# Patient Record
Sex: MALE | Race: WHITE | NOT HISPANIC OR LATINO | Employment: UNEMPLOYED | URBAN - METROPOLITAN AREA
[De-identification: names, ages, dates, MRNs, and addresses within clinical notes are randomized per-mention and may not be internally consistent; named-entity substitution may affect disease eponyms.]

---

## 2019-06-24 ENCOUNTER — HOSPITAL ENCOUNTER (INPATIENT)
Facility: MEDICAL CENTER | Age: 49
LOS: 2 days | DRG: 564 | End: 2019-06-27
Attending: EMERGENCY MEDICINE | Admitting: HOSPITALIST
Payer: OTHER MISCELLANEOUS

## 2019-06-24 DIAGNOSIS — R41.0 DELIRIUM: ICD-10-CM

## 2019-06-24 DIAGNOSIS — N17.9 AKI (ACUTE KIDNEY INJURY) (HCC): ICD-10-CM

## 2019-06-24 LAB
ALBUMIN SERPL BCP-MCNC: 4.9 G/DL (ref 3.2–4.9)
ALBUMIN/GLOB SERPL: 1.8 G/DL
ALP SERPL-CCNC: 79 U/L (ref 30–99)
ALT SERPL-CCNC: 89 U/L (ref 2–50)
ANION GAP SERPL CALC-SCNC: 14 MMOL/L (ref 0–11.9)
AST SERPL-CCNC: 95 U/L (ref 12–45)
BASOPHILS # BLD AUTO: 0.2 % (ref 0–1.8)
BASOPHILS # BLD: 0.03 K/UL (ref 0–0.12)
BILIRUB SERPL-MCNC: 1.7 MG/DL (ref 0.1–1.5)
BUN SERPL-MCNC: 42 MG/DL (ref 8–22)
CALCIUM SERPL-MCNC: 9.5 MG/DL (ref 8.5–10.5)
CHLORIDE SERPL-SCNC: 95 MMOL/L (ref 96–112)
CK SERPL-CCNC: 1334 U/L (ref 0–154)
CO2 SERPL-SCNC: 21 MMOL/L (ref 20–33)
CREAT SERPL-MCNC: 1.82 MG/DL (ref 0.5–1.4)
EOSINOPHIL # BLD AUTO: 0.06 K/UL (ref 0–0.51)
EOSINOPHIL NFR BLD: 0.5 % (ref 0–6.9)
ERYTHROCYTE [DISTWIDTH] IN BLOOD BY AUTOMATED COUNT: 46.3 FL (ref 35.9–50)
ETHANOL BLD-MCNC: 0.01 G/DL
GLOBULIN SER CALC-MCNC: 2.8 G/DL (ref 1.9–3.5)
GLUCOSE SERPL-MCNC: 109 MG/DL (ref 65–99)
HCT VFR BLD AUTO: 42.7 % (ref 42–52)
HGB BLD-MCNC: 14.5 G/DL (ref 14–18)
IMM GRANULOCYTES # BLD AUTO: 0.13 K/UL (ref 0–0.11)
IMM GRANULOCYTES NFR BLD AUTO: 1.1 % (ref 0–0.9)
INR PPP: 0.97 (ref 0.87–1.13)
LYMPHOCYTES # BLD AUTO: 0.57 K/UL (ref 1–4.8)
LYMPHOCYTES NFR BLD: 4.6 % (ref 22–41)
MCH RBC QN AUTO: 35.2 PG (ref 27–33)
MCHC RBC AUTO-ENTMCNC: 34 G/DL (ref 33.7–35.3)
MCV RBC AUTO: 103.6 FL (ref 81.4–97.8)
MONOCYTES # BLD AUTO: 1.41 K/UL (ref 0–0.85)
MONOCYTES NFR BLD AUTO: 11.5 % (ref 0–13.4)
NEUTROPHILS # BLD AUTO: 10.11 K/UL (ref 1.82–7.42)
NEUTROPHILS NFR BLD: 82.1 % (ref 44–72)
NRBC # BLD AUTO: 0 K/UL
NRBC BLD-RTO: 0 /100 WBC
PLATELET # BLD AUTO: 220 K/UL (ref 164–446)
PMV BLD AUTO: 9.8 FL (ref 9–12.9)
POTASSIUM SERPL-SCNC: 4.6 MMOL/L (ref 3.6–5.5)
PROT SERPL-MCNC: 7.7 G/DL (ref 6–8.2)
PROTHROMBIN TIME: 13.1 SEC (ref 12–14.6)
RBC # BLD AUTO: 4.12 M/UL (ref 4.7–6.1)
SODIUM SERPL-SCNC: 130 MMOL/L (ref 135–145)
WBC # BLD AUTO: 12.3 K/UL (ref 4.8–10.8)

## 2019-06-24 PROCEDURE — 85025 COMPLETE CBC W/AUTO DIFF WBC: CPT

## 2019-06-24 PROCEDURE — 82550 ASSAY OF CK (CPK): CPT

## 2019-06-24 PROCEDURE — 80053 COMPREHEN METABOLIC PANEL: CPT

## 2019-06-24 PROCEDURE — 700105 HCHG RX REV CODE 258: Performed by: EMERGENCY MEDICINE

## 2019-06-24 PROCEDURE — 84133 ASSAY OF URINE POTASSIUM: CPT

## 2019-06-24 PROCEDURE — 82436 ASSAY OF URINE CHLORIDE: CPT

## 2019-06-24 PROCEDURE — 96361 HYDRATE IV INFUSION ADD-ON: CPT

## 2019-06-24 PROCEDURE — 83935 ASSAY OF URINE OSMOLALITY: CPT

## 2019-06-24 PROCEDURE — 83735 ASSAY OF MAGNESIUM: CPT

## 2019-06-24 PROCEDURE — 85610 PROTHROMBIN TIME: CPT

## 2019-06-24 PROCEDURE — 84156 ASSAY OF PROTEIN URINE: CPT

## 2019-06-24 PROCEDURE — 82570 ASSAY OF URINE CREATININE: CPT

## 2019-06-24 PROCEDURE — 80307 DRUG TEST PRSMV CHEM ANLYZR: CPT

## 2019-06-24 PROCEDURE — 84300 ASSAY OF URINE SODIUM: CPT

## 2019-06-24 PROCEDURE — 81001 URINALYSIS AUTO W/SCOPE: CPT

## 2019-06-24 PROCEDURE — 99285 EMERGENCY DEPT VISIT HI MDM: CPT

## 2019-06-24 RX ORDER — SODIUM CHLORIDE 9 MG/ML
1000 INJECTION, SOLUTION INTRAVENOUS ONCE
Status: COMPLETED | OUTPATIENT
Start: 2019-06-24 | End: 2019-06-25

## 2019-06-24 RX ADMIN — SODIUM CHLORIDE 1000 ML: 9 INJECTION, SOLUTION INTRAVENOUS at 21:30

## 2019-06-25 ENCOUNTER — APPOINTMENT (OUTPATIENT)
Dept: RADIOLOGY | Facility: MEDICAL CENTER | Age: 49
DRG: 564 | End: 2019-06-25
Attending: HOSPITALIST
Payer: OTHER MISCELLANEOUS

## 2019-06-25 ENCOUNTER — APPOINTMENT (OUTPATIENT)
Dept: RADIOLOGY | Facility: MEDICAL CENTER | Age: 49
DRG: 564 | End: 2019-06-25
Attending: EMERGENCY MEDICINE
Payer: OTHER MISCELLANEOUS

## 2019-06-25 PROBLEM — G93.40 ENCEPHALOPATHY: Status: ACTIVE | Noted: 2019-06-25

## 2019-06-25 PROBLEM — F10.151: Status: ACTIVE | Noted: 2019-06-25

## 2019-06-25 PROBLEM — G40.909 SEIZURE DISORDER (HCC): Status: ACTIVE | Noted: 2019-06-25

## 2019-06-25 PROBLEM — R74.01 TRANSAMINITIS: Status: ACTIVE | Noted: 2019-06-25

## 2019-06-25 PROBLEM — K76.82 HEPATIC ENCEPHALOPATHY (HCC): Status: ACTIVE | Noted: 2019-06-25

## 2019-06-25 PROBLEM — E87.1 HYPONATREMIA: Status: ACTIVE | Noted: 2019-06-25

## 2019-06-25 PROBLEM — M62.82 RHABDOMYOLYSIS: Status: ACTIVE | Noted: 2019-06-25

## 2019-06-25 PROBLEM — N17.9 AKI (ACUTE KIDNEY INJURY) (HCC): Status: ACTIVE | Noted: 2019-06-25

## 2019-06-25 PROBLEM — F10.929 ALCOHOL INTOXICATION (HCC): Status: ACTIVE | Noted: 2019-06-25

## 2019-06-25 LAB
AMMONIA PLAS-SCNC: 58 UMOL/L (ref 11–45)
AMPHET UR QL SCN: NEGATIVE
APPEARANCE UR: CLEAR
BACTERIA #/AREA URNS HPF: NEGATIVE /HPF
BARBITURATES UR QL SCN: NEGATIVE
BENZODIAZ UR QL SCN: NEGATIVE
BILIRUB UR QL STRIP.AUTO: NEGATIVE
BZE UR QL SCN: NEGATIVE
CANNABINOIDS UR QL SCN: NEGATIVE
CHLORIDE UR-SCNC: <15 MMOL/L
COLOR UR: YELLOW
CREAT UR-MCNC: 185.2 MG/DL
EPI CELLS #/AREA URNS HPF: NEGATIVE /HPF
GLUCOSE UR STRIP.AUTO-MCNC: NEGATIVE MG/DL
HAV IGM SERPL QL IA: NEGATIVE
HBV CORE IGM SER QL: NEGATIVE
HBV SURFACE AG SER QL: NEGATIVE
HCV AB SER QL: NEGATIVE
HYALINE CASTS #/AREA URNS LPF: ABNORMAL /LPF
KETONES UR STRIP.AUTO-MCNC: 15 MG/DL
LEUKOCYTE ESTERASE UR QL STRIP.AUTO: NEGATIVE
MAGNESIUM SERPL-MCNC: 2.5 MG/DL (ref 1.5–2.5)
METHADONE UR QL SCN: NEGATIVE
MICRO URNS: ABNORMAL
NITRITE UR QL STRIP.AUTO: NEGATIVE
OPIATES UR QL SCN: NEGATIVE
OSMOLALITY SERPL: 278 MOSM/KG H2O (ref 278–298)
OSMOLALITY UR: 726 MOSM/KG H2O (ref 300–900)
OXYCODONE UR QL SCN: NEGATIVE
PCP UR QL SCN: NEGATIVE
PH UR STRIP.AUTO: 5.5 [PH]
POTASSIUM UR-SCNC: 60.9 MMOL/L
PROPOXYPH UR QL SCN: NEGATIVE
PROT UR QL STRIP: NEGATIVE MG/DL
PROT UR-MCNC: 29.1 MG/DL (ref 0–15)
RBC # URNS HPF: ABNORMAL /HPF
RBC UR QL AUTO: ABNORMAL
SODIUM UR-SCNC: 21 MMOL/L
SP GR UR STRIP.AUTO: 1.02
TROPONIN I SERPL-MCNC: 0.02 NG/ML (ref 0–0.04)
UROBILINOGEN UR STRIP.AUTO-MCNC: 0.2 MG/DL
WBC #/AREA URNS HPF: ABNORMAL /HPF

## 2019-06-25 PROCEDURE — 700111 HCHG RX REV CODE 636 W/ 250 OVERRIDE (IP): Performed by: HOSPITALIST

## 2019-06-25 PROCEDURE — 700101 HCHG RX REV CODE 250: Performed by: EMERGENCY MEDICINE

## 2019-06-25 PROCEDURE — 82140 ASSAY OF AMMONIA: CPT

## 2019-06-25 PROCEDURE — 84484 ASSAY OF TROPONIN QUANT: CPT

## 2019-06-25 PROCEDURE — 71045 X-RAY EXAM CHEST 1 VIEW: CPT

## 2019-06-25 PROCEDURE — 700105 HCHG RX REV CODE 258: Performed by: HOSPITALIST

## 2019-06-25 PROCEDURE — 700102 HCHG RX REV CODE 250 W/ 637 OVERRIDE(OP): Performed by: INTERNAL MEDICINE

## 2019-06-25 PROCEDURE — A9270 NON-COVERED ITEM OR SERVICE: HCPCS | Performed by: HOSPITALIST

## 2019-06-25 PROCEDURE — 83930 ASSAY OF BLOOD OSMOLALITY: CPT

## 2019-06-25 PROCEDURE — 700102 HCHG RX REV CODE 250 W/ 637 OVERRIDE(OP): Performed by: HOSPITALIST

## 2019-06-25 PROCEDURE — 99233 SBSQ HOSP IP/OBS HIGH 50: CPT | Performed by: HOSPITALIST

## 2019-06-25 PROCEDURE — 96361 HYDRATE IV INFUSION ADD-ON: CPT

## 2019-06-25 PROCEDURE — A9270 NON-COVERED ITEM OR SERVICE: HCPCS | Performed by: INTERNAL MEDICINE

## 2019-06-25 PROCEDURE — 96365 THER/PROPH/DIAG IV INF INIT: CPT

## 2019-06-25 PROCEDURE — 770020 HCHG ROOM/CARE - TELE (206)

## 2019-06-25 PROCEDURE — 700105 HCHG RX REV CODE 258: Performed by: EMERGENCY MEDICINE

## 2019-06-25 PROCEDURE — 80074 ACUTE HEPATITIS PANEL: CPT

## 2019-06-25 PROCEDURE — 87040 BLOOD CULTURE FOR BACTERIA: CPT

## 2019-06-25 PROCEDURE — 70450 CT HEAD/BRAIN W/O DYE: CPT

## 2019-06-25 PROCEDURE — 36415 COLL VENOUS BLD VENIPUNCTURE: CPT

## 2019-06-25 PROCEDURE — 99223 1ST HOSP IP/OBS HIGH 75: CPT | Performed by: HOSPITALIST

## 2019-06-25 PROCEDURE — 700111 HCHG RX REV CODE 636 W/ 250 OVERRIDE (IP): Performed by: EMERGENCY MEDICINE

## 2019-06-25 PROCEDURE — 96375 TX/PRO/DX INJ NEW DRUG ADDON: CPT

## 2019-06-25 PROCEDURE — 96366 THER/PROPH/DIAG IV INF ADDON: CPT

## 2019-06-25 RX ORDER — LORAZEPAM 1 MG/1
1 TABLET ORAL ONCE
Status: DISCONTINUED | OUTPATIENT
Start: 2019-06-25 | End: 2019-06-25

## 2019-06-25 RX ORDER — SODIUM CHLORIDE 9 MG/ML
INJECTION, SOLUTION INTRAVENOUS CONTINUOUS
Status: DISCONTINUED | OUTPATIENT
Start: 2019-06-25 | End: 2019-06-26

## 2019-06-25 RX ORDER — SODIUM CHLORIDE 9 MG/ML
4000 INJECTION, SOLUTION INTRAVENOUS ONCE
Status: COMPLETED | OUTPATIENT
Start: 2019-06-25 | End: 2019-06-25

## 2019-06-25 RX ORDER — BISACODYL 10 MG
10 SUPPOSITORY, RECTAL RECTAL
Status: DISCONTINUED | OUTPATIENT
Start: 2019-06-25 | End: 2019-06-27 | Stop reason: HOSPADM

## 2019-06-25 RX ORDER — LEVETIRACETAM 500 MG/1
500 TABLET ORAL 2 TIMES DAILY
Status: ON HOLD | COMMUNITY
End: 2019-06-27

## 2019-06-25 RX ORDER — LORAZEPAM 2 MG/1
2 TABLET ORAL
Status: DISCONTINUED | OUTPATIENT
Start: 2019-06-25 | End: 2019-06-25

## 2019-06-25 RX ORDER — LORAZEPAM 1 MG/1
1 TABLET ORAL EVERY 4 HOURS PRN
Status: DISCONTINUED | OUTPATIENT
Start: 2019-06-25 | End: 2019-06-25

## 2019-06-25 RX ORDER — CLONIDINE HYDROCHLORIDE 0.1 MG/1
0.1 TABLET ORAL
Status: DISCONTINUED | OUTPATIENT
Start: 2019-06-25 | End: 2019-06-27 | Stop reason: HOSPADM

## 2019-06-25 RX ORDER — HEPARIN SODIUM 5000 [USP'U]/ML
5000 INJECTION, SOLUTION INTRAVENOUS; SUBCUTANEOUS EVERY 8 HOURS
Status: DISCONTINUED | OUTPATIENT
Start: 2019-06-25 | End: 2019-06-27 | Stop reason: HOSPADM

## 2019-06-25 RX ORDER — LORAZEPAM 1 MG/1
0.5 TABLET ORAL EVERY 4 HOURS PRN
Status: DISCONTINUED | OUTPATIENT
Start: 2019-06-25 | End: 2019-06-25

## 2019-06-25 RX ORDER — LORAZEPAM 2 MG/ML
1 INJECTION INTRAMUSCULAR
Status: DISCONTINUED | OUTPATIENT
Start: 2019-06-25 | End: 2019-06-25

## 2019-06-25 RX ORDER — LACTULOSE 20 G/30ML
30 SOLUTION ORAL 3 TIMES DAILY
Status: DISCONTINUED | OUTPATIENT
Start: 2019-06-25 | End: 2019-06-26

## 2019-06-25 RX ORDER — THIAMINE MONONITRATE (VIT B1) 100 MG
100 TABLET ORAL DAILY
Status: DISCONTINUED | OUTPATIENT
Start: 2019-06-26 | End: 2019-06-25

## 2019-06-25 RX ORDER — LORAZEPAM 2 MG/1
4 TABLET ORAL
Status: DISCONTINUED | OUTPATIENT
Start: 2019-06-25 | End: 2019-06-25

## 2019-06-25 RX ORDER — FOLIC ACID 1 MG/1
1 TABLET ORAL DAILY
Status: DISCONTINUED | OUTPATIENT
Start: 2019-06-25 | End: 2019-06-27 | Stop reason: HOSPADM

## 2019-06-25 RX ORDER — SODIUM CHLORIDE 9 MG/ML
1000 INJECTION, SOLUTION INTRAVENOUS ONCE
Status: COMPLETED | OUTPATIENT
Start: 2019-06-25 | End: 2019-06-25

## 2019-06-25 RX ORDER — THIAMINE MONONITRATE (VIT B1) 100 MG
100 TABLET ORAL DAILY
Status: DISCONTINUED | OUTPATIENT
Start: 2019-06-25 | End: 2019-06-27 | Stop reason: HOSPADM

## 2019-06-25 RX ORDER — ACETAMINOPHEN 325 MG/1
650 TABLET ORAL EVERY 6 HOURS PRN
Status: DISCONTINUED | OUTPATIENT
Start: 2019-06-25 | End: 2019-06-27 | Stop reason: HOSPADM

## 2019-06-25 RX ORDER — POLYETHYLENE GLYCOL 3350 17 G/17G
1 POWDER, FOR SOLUTION ORAL
Status: DISCONTINUED | OUTPATIENT
Start: 2019-06-25 | End: 2019-06-27 | Stop reason: HOSPADM

## 2019-06-25 RX ORDER — LORAZEPAM 2 MG/ML
1 INJECTION INTRAMUSCULAR ONCE
Status: COMPLETED | OUTPATIENT
Start: 2019-06-25 | End: 2019-06-25

## 2019-06-25 RX ORDER — FOLIC ACID 1 MG/1
1 TABLET ORAL DAILY
Status: DISCONTINUED | OUTPATIENT
Start: 2019-06-26 | End: 2019-06-25

## 2019-06-25 RX ORDER — AMOXICILLIN 250 MG
2 CAPSULE ORAL 2 TIMES DAILY
Status: DISCONTINUED | OUTPATIENT
Start: 2019-06-25 | End: 2019-06-27 | Stop reason: HOSPADM

## 2019-06-25 RX ORDER — LORAZEPAM 2 MG/ML
1.5 INJECTION INTRAMUSCULAR
Status: DISCONTINUED | OUTPATIENT
Start: 2019-06-25 | End: 2019-06-25

## 2019-06-25 RX ORDER — LEVETIRACETAM 500 MG/1
500 TABLET ORAL 2 TIMES DAILY
Status: DISCONTINUED | OUTPATIENT
Start: 2019-06-25 | End: 2019-06-27 | Stop reason: HOSPADM

## 2019-06-25 RX ORDER — LORAZEPAM 2 MG/ML
0.5 INJECTION INTRAMUSCULAR EVERY 4 HOURS PRN
Status: DISCONTINUED | OUTPATIENT
Start: 2019-06-25 | End: 2019-06-25

## 2019-06-25 RX ORDER — LORAZEPAM 2 MG/ML
2 INJECTION INTRAMUSCULAR
Status: DISCONTINUED | OUTPATIENT
Start: 2019-06-25 | End: 2019-06-25

## 2019-06-25 RX ADMIN — ACETAMINOPHEN 650 MG: 325 TABLET, FILM COATED ORAL at 23:49

## 2019-06-25 RX ADMIN — LACTULOSE 30 ML: 20 SOLUTION ORAL at 17:02

## 2019-06-25 RX ADMIN — THIAMINE HYDROCHLORIDE: 100 INJECTION, SOLUTION INTRAMUSCULAR; INTRAVENOUS at 02:15

## 2019-06-25 RX ADMIN — HEPARIN SODIUM 5000 UNITS: 5000 INJECTION, SOLUTION INTRAVENOUS; SUBCUTANEOUS at 13:15

## 2019-06-25 RX ADMIN — LEVETIRACETAM 500 MG: 500 TABLET ORAL at 20:17

## 2019-06-25 RX ADMIN — SODIUM CHLORIDE: 9 INJECTION, SOLUTION INTRAVENOUS at 11:45

## 2019-06-25 RX ADMIN — SODIUM CHLORIDE 4000 ML: 9 INJECTION, SOLUTION INTRAVENOUS at 04:00

## 2019-06-25 RX ADMIN — SODIUM CHLORIDE: 9 INJECTION, SOLUTION INTRAVENOUS at 18:39

## 2019-06-25 RX ADMIN — SODIUM CHLORIDE 1000 ML: 9 INJECTION, SOLUTION INTRAVENOUS at 01:00

## 2019-06-25 RX ADMIN — HEPARIN SODIUM 5000 UNITS: 5000 INJECTION, SOLUTION INTRAVENOUS; SUBCUTANEOUS at 20:17

## 2019-06-25 RX ADMIN — LACTULOSE 30 ML: 20 SOLUTION ORAL at 11:45

## 2019-06-25 RX ADMIN — LORAZEPAM 1 MG: 2 INJECTION INTRAMUSCULAR; INTRAVENOUS at 01:20

## 2019-06-25 ASSESSMENT — LIFESTYLE VARIABLES
HOW MANY TIMES IN THE PAST YEAR HAVE YOU HAD 5 OR MORE DRINKS IN A DAY: 0
CONSUMPTION TOTAL: POSITIVE
AVERAGE NUMBER OF DAYS PER WEEK YOU HAVE A DRINK CONTAINING ALCOHOL: 10
ALCOHOL_USE: YES
SUBSTANCE_ABUSE: 1
DOES PATIENT WANT TO STOP DRINKING: NO
TOTAL SCORE: 4
EVER HAD A DRINK FIRST THING IN THE MORNING TO STEADY YOUR NERVES TO GET RID OF A HANGOVER: YES
TOTAL SCORE: 4
HAVE YOU EVER FELT YOU SHOULD CUT DOWN ON YOUR DRINKING: YES
EVER FELT BAD OR GUILTY ABOUT YOUR DRINKING: YES
ON A TYPICAL DAY WHEN YOU DRINK ALCOHOL HOW MANY DRINKS DO YOU HAVE: 2
TOTAL SCORE: 4
HAVE PEOPLE ANNOYED YOU BY CRITICIZING YOUR DRINKING: YES

## 2019-06-25 ASSESSMENT — ENCOUNTER SYMPTOMS
DIZZINESS: 0
DIAPHORESIS: 0
DEPRESSION: 0
MYALGIAS: 1
CHILLS: 0
EYE DISCHARGE: 0
HEADACHES: 0
VOMITING: 0
FOCAL WEAKNESS: 0
MEMORY LOSS: 1
NECK PAIN: 0
CONSTIPATION: 0
PALPITATIONS: 0
SHORTNESS OF BREATH: 0
ABDOMINAL PAIN: 0
FEVER: 0
SENSORY CHANGE: 0
NAUSEA: 0
SORE THROAT: 0
EYE PAIN: 0
BACK PAIN: 0
LOSS OF CONSCIOUSNESS: 0
CLAUDICATION: 0
BRUISES/BLEEDS EASILY: 0
HEMOPTYSIS: 0
COUGH: 0
WHEEZING: 0
SPUTUM PRODUCTION: 0
SPEECH CHANGE: 0
DIARRHEA: 0
WEAKNESS: 0

## 2019-06-25 ASSESSMENT — PATIENT HEALTH QUESTIONNAIRE - PHQ9
SUM OF ALL RESPONSES TO PHQ9 QUESTIONS 1 AND 2: 0
1. LITTLE INTEREST OR PLEASURE IN DOING THINGS: NOT AT ALL
2. FEELING DOWN, DEPRESSED, IRRITABLE, OR HOPELESS: NOT AT ALL

## 2019-06-25 ASSESSMENT — COGNITIVE AND FUNCTIONAL STATUS - GENERAL
SUGGESTED CMS G CODE MODIFIER DAILY ACTIVITY: CH
MOBILITY SCORE: 24
SUGGESTED CMS G CODE MODIFIER MOBILITY: CH
DAILY ACTIVITIY SCORE: 24

## 2019-06-25 NOTE — ASSESSMENT & PLAN NOTE
Multifactorial, toxic metabolic, dehydration, baseline encephalomalacia, history of traumatic brain injury.  UA is unrevealing, his CT head shows a old injury but nothing acute.    Had mildly elevated ammonia was initially started on lactulose, patient is currently having a lot of diarrhea, I am stopping  Improving

## 2019-06-25 NOTE — ED NOTES
Pt awoke at this time.  Up to eob and urinated on the floor.  Pt back to bed at this time.  Continue to monitor.

## 2019-06-25 NOTE — CARE PLAN
Problem: Safety  Goal: Will remain free from falls  Outcome: PROGRESSING AS EXPECTED  Patient stable and has a steady gait. Following directions.    Problem: Knowledge Deficit  Goal: Knowledge of disease process/condition, treatment plan, diagnostic tests, and medications will improve  Outcome: PROGRESSING SLOWER THAN EXPECTED  Patient cooperative but confused. Desires to know more about treatment plan. MD notified and reported she would be up to see him soon.

## 2019-06-25 NOTE — ED NOTES
Pt getting himself undressed and attempting to get up out of room.  Pt states at this time that he is scared of this nurse and afraid of harm.  Pt chooses not to wear gown at this time.  Pt thinks he has been abducted by aliens and going to have battery acid given to him.  Pt difficult to re orient at this time.  Pt sitting back in bed.  Security and MD paged.  Life skills called.  Life skillls at BS
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
## 2019-06-25 NOTE — ED NOTES
Pt provided with o2 2l/nc secondary sats down to 84% while sleeping RA.  Pt arousable.  Continues to be disoriented.

## 2019-06-25 NOTE — DISCHARGE PLANNING
"Patient sitting in bed and appears confused with some brief moments of lucidity.  During conversation patient stood up and went to doorway yelling, \"Whats wrong? What do you need?\" Patient reported he thought he heard his wife crying; however, earlier in the conversation, patient stated he is not . Patient is from [place], [place] and does not remember why or when he came to [place]. States he has been walking for hours and does not know where his car is. \"I lost it somewhere.  Not sure where.\"   Reports he recently left a detox/rehab facility in [place]; however, does not remember how long he was there or when he was discharged.  Patient does admit to being an alcoholic and has been hospitalized on multiple occassions for detox/rehab. Patient denies history of mental illness or treatment outside of substance addiction/dependence.    "

## 2019-06-25 NOTE — PROGRESS NOTES
Assumed care of patient and bedside report received from previous RN Cameron. Plan of care discussed with patient. All concerns voiced at this time. Patient is alert and oriented x 3-4. VSS. Bed controls on, bed locked and in lowest position. Patient is in rhythm: SR. Patient is on legal hold. Paperwork is signed and in patient's chart. All items removed from patient's room. Security signed off on patient's belongings which are now locked on unit. Awaiting MD Villela's assessment of patient for instructions on patient's exact privileges. Notified her that patient is on unit and she noted that she will be up shortly to see him. Will continue to monitor.

## 2019-06-25 NOTE — ED NOTES
Attending Hospitalist is Dr Villela starting at 0700. Please contact this physician for orders, updates or questions today.

## 2019-06-25 NOTE — ASSESSMENT & PLAN NOTE
Improving with intravenous fluids.  Had acute kidney injury initially that is improving with intravenous fluids

## 2019-06-25 NOTE — ED NOTES
Received report from DOYLE Ac, taking over pt care. Pt resting comfortably, no needs at this time, bed in lowered position, side rails up, pt has no needs at this time
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
## 2019-06-25 NOTE — PROGRESS NOTES
2 RN skin check complete with RNDebbie.  Devices in place: none.  Skin assess under devices: n/a.  Confirmed pressure ulcers found on: n/a.  New potential pressure ulcers noted on: n/a. Wound consult placed and wound reported: n/a.    Skin issues are as follows:  BL cracking on heels.  Left big toe cracked with old blood.  Scattered small scabs on BL feet.  Upper neck, face, BL shoulders, BL forearms, and BL ears sunburned and blanching.   Sacrum red and blanching.    The following interventions in place:  Patient turns self from side to side, pillows in use for support/repositioning, moisturizer PRN

## 2019-06-25 NOTE — PROGRESS NOTES
"Patient gave multiple numbers of friends and family members to contact with permission to discuss patient's medical situation and plan of care. Voicemails left for those who did not answer. Father answered, patient's current situation updated and plan of care discussed. Father lives in [place] and reported he will be the one to  his son when discharge is approaching and had no further questions. Father notified of our unit's number for further questions.    Patient reports that he \"thinks he has a wife but does not know for sure.\" Reports \"I am seeing two women but am not sure if one is my wife or if the other is my girlfriend.\" Patient is appropriate, calm, and cooperative and maintains mostly coherent conversation. Still reports that he only remembers \"pieces of the last month.\" Reports that he has not used drugs in years and \"doesn't drink much.\" Still awaiting doctor Tika's orders as she has not yet seen the patient. Will continue to monitor.    "

## 2019-06-25 NOTE — ED TRIAGE NOTES
BIB by ems on Legal 2000 for irratic behavior.  Pt was last seen in [place] by father in a alcohol detox center.  Pt made it [place] in a car and has been walking for several hours today with no car to be found.   Pt was attempting to get into another persons residence and car thinking it was his own.  Disoriented to paramedics.  Pt slow to respond but answering questions appropriately other than current situation and why he came to [place].  Pt does not understand why he was brought to [facility].

## 2019-06-26 LAB
ALBUMIN SERPL BCP-MCNC: 3.2 G/DL (ref 3.2–4.9)
ALBUMIN/GLOB SERPL: 1.5 G/DL
ALP SERPL-CCNC: 67 U/L (ref 30–99)
ALT SERPL-CCNC: 69 U/L (ref 2–50)
ANION GAP SERPL CALC-SCNC: 8 MMOL/L (ref 0–11.9)
AST SERPL-CCNC: 66 U/L (ref 12–45)
BILIRUB SERPL-MCNC: 0.7 MG/DL (ref 0.1–1.5)
BUN SERPL-MCNC: 9 MG/DL (ref 8–22)
CALCIUM SERPL-MCNC: 8.3 MG/DL (ref 8.5–10.5)
CHLORIDE SERPL-SCNC: 101 MMOL/L (ref 96–112)
CK SERPL-CCNC: 638 U/L (ref 0–154)
CO2 SERPL-SCNC: 22 MMOL/L (ref 20–33)
CREAT SERPL-MCNC: 0.71 MG/DL (ref 0.5–1.4)
ERYTHROCYTE [DISTWIDTH] IN BLOOD BY AUTOMATED COUNT: 44.8 FL (ref 35.9–50)
GLOBULIN SER CALC-MCNC: 2.1 G/DL (ref 1.9–3.5)
GLUCOSE SERPL-MCNC: 103 MG/DL (ref 65–99)
HCT VFR BLD AUTO: 32.9 % (ref 42–52)
HGB BLD-MCNC: 11.4 G/DL (ref 14–18)
MAGNESIUM SERPL-MCNC: 1.7 MG/DL (ref 1.5–2.5)
MCH RBC QN AUTO: 36 PG (ref 27–33)
MCHC RBC AUTO-ENTMCNC: 34.9 G/DL (ref 33.7–35.3)
MCV RBC AUTO: 103.2 FL (ref 81.4–97.8)
PHOSPHATE SERPL-MCNC: 2.9 MG/DL (ref 2.5–4.5)
PLATELET # BLD AUTO: 187 K/UL (ref 164–446)
PMV BLD AUTO: 9.5 FL (ref 9–12.9)
POTASSIUM SERPL-SCNC: 3.6 MMOL/L (ref 3.6–5.5)
PROT SERPL-MCNC: 5.3 G/DL (ref 6–8.2)
RBC # BLD AUTO: 3.17 M/UL (ref 4.7–6.1)
SODIUM SERPL-SCNC: 131 MMOL/L (ref 135–145)
VIT B12 SERPL-MCNC: 504 PG/ML (ref 211–911)
WBC # BLD AUTO: 6.1 K/UL (ref 4.8–10.8)

## 2019-06-26 PROCEDURE — 85027 COMPLETE CBC AUTOMATED: CPT

## 2019-06-26 PROCEDURE — A9270 NON-COVERED ITEM OR SERVICE: HCPCS | Performed by: HOSPITALIST

## 2019-06-26 PROCEDURE — 700105 HCHG RX REV CODE 258: Performed by: HOSPITALIST

## 2019-06-26 PROCEDURE — 82550 ASSAY OF CK (CPK): CPT

## 2019-06-26 PROCEDURE — A9270 NON-COVERED ITEM OR SERVICE: HCPCS | Performed by: STUDENT IN AN ORGANIZED HEALTH CARE EDUCATION/TRAINING PROGRAM

## 2019-06-26 PROCEDURE — 84425 ASSAY OF VITAMIN B-1: CPT

## 2019-06-26 PROCEDURE — 700102 HCHG RX REV CODE 250 W/ 637 OVERRIDE(OP): Performed by: STUDENT IN AN ORGANIZED HEALTH CARE EDUCATION/TRAINING PROGRAM

## 2019-06-26 PROCEDURE — 99222 1ST HOSP IP/OBS MODERATE 55: CPT | Mod: GC | Performed by: PSYCHIATRY & NEUROLOGY

## 2019-06-26 PROCEDURE — 99406 BEHAV CHNG SMOKING 3-10 MIN: CPT | Performed by: HOSPITALIST

## 2019-06-26 PROCEDURE — 83735 ASSAY OF MAGNESIUM: CPT

## 2019-06-26 PROCEDURE — 99232 SBSQ HOSP IP/OBS MODERATE 35: CPT | Mod: 25 | Performed by: HOSPITALIST

## 2019-06-26 PROCEDURE — 84100 ASSAY OF PHOSPHORUS: CPT

## 2019-06-26 PROCEDURE — 700111 HCHG RX REV CODE 636 W/ 250 OVERRIDE (IP): Performed by: HOSPITALIST

## 2019-06-26 PROCEDURE — 700102 HCHG RX REV CODE 250 W/ 637 OVERRIDE(OP): Performed by: HOSPITALIST

## 2019-06-26 PROCEDURE — 82607 VITAMIN B-12: CPT

## 2019-06-26 PROCEDURE — 770020 HCHG ROOM/CARE - TELE (206)

## 2019-06-26 PROCEDURE — 36415 COLL VENOUS BLD VENIPUNCTURE: CPT

## 2019-06-26 PROCEDURE — 80053 COMPREHEN METABOLIC PANEL: CPT

## 2019-06-26 RX ORDER — MEMANTINE HYDROCHLORIDE 10 MG/1
5 TABLET ORAL DAILY
Status: DISCONTINUED | OUTPATIENT
Start: 2019-06-26 | End: 2019-06-27 | Stop reason: HOSPADM

## 2019-06-26 RX ORDER — NICOTINE 21 MG/24HR
21 PATCH, TRANSDERMAL 24 HOURS TRANSDERMAL
Status: DISCONTINUED | OUTPATIENT
Start: 2019-06-26 | End: 2019-06-27 | Stop reason: HOSPADM

## 2019-06-26 RX ORDER — M-VIT,TX,IRON,MINS/CALC/FOLIC 27MG-0.4MG
1 TABLET ORAL DAILY
COMMUNITY

## 2019-06-26 RX ORDER — ARIPIPRAZOLE 2 MG/1
2 TABLET ORAL DAILY
Status: DISCONTINUED | OUTPATIENT
Start: 2019-06-26 | End: 2019-06-27 | Stop reason: HOSPADM

## 2019-06-26 RX ADMIN — MEMANTINE HYDROCHLORIDE 5 MG: 10 TABLET ORAL at 12:33

## 2019-06-26 RX ADMIN — LACTULOSE 30 ML: 20 SOLUTION ORAL at 12:35

## 2019-06-26 RX ADMIN — LEVETIRACETAM 500 MG: 500 TABLET ORAL at 17:17

## 2019-06-26 RX ADMIN — NICOTINE 21 MG: 21 PATCH, EXTENDED RELEASE TRANSDERMAL at 12:35

## 2019-06-26 RX ADMIN — FOLIC ACID 1 MG: 1 TABLET ORAL at 05:46

## 2019-06-26 RX ADMIN — ARIPIPRAZOLE 2 MG: 2 TABLET ORAL at 12:34

## 2019-06-26 RX ADMIN — HEPARIN SODIUM 5000 UNITS: 5000 INJECTION, SOLUTION INTRAVENOUS; SUBCUTANEOUS at 20:25

## 2019-06-26 RX ADMIN — THERA TABS 1 TABLET: TAB at 05:46

## 2019-06-26 RX ADMIN — HEPARIN SODIUM 5000 UNITS: 5000 INJECTION, SOLUTION INTRAVENOUS; SUBCUTANEOUS at 13:12

## 2019-06-26 RX ADMIN — LEVETIRACETAM 500 MG: 500 TABLET ORAL at 05:44

## 2019-06-26 RX ADMIN — SODIUM CHLORIDE: 9 INJECTION, SOLUTION INTRAVENOUS at 08:12

## 2019-06-26 RX ADMIN — LACTULOSE 30 ML: 20 SOLUTION ORAL at 05:44

## 2019-06-26 RX ADMIN — Medication 100 MG: at 05:46

## 2019-06-26 RX ADMIN — SODIUM CHLORIDE: 9 INJECTION, SOLUTION INTRAVENOUS at 01:27

## 2019-06-26 RX ADMIN — HEPARIN SODIUM 5000 UNITS: 5000 INJECTION, SOLUTION INTRAVENOUS; SUBCUTANEOUS at 05:46

## 2019-06-26 ASSESSMENT — LIFESTYLE VARIABLES
ANXIETY: NO ANXIETY (AT EASE)
AGITATION: NORMAL ACTIVITY
TOTAL SCORE: 0
PAROXYSMAL SWEATS: NO SWEAT VISIBLE
ORIENTATION AND CLOUDING OF SENSORIUM: ORIENTED AND CAN DO SERIAL ADDITIONS
HEADACHE, FULLNESS IN HEAD: NOT PRESENT
VISUAL DISTURBANCES: NOT PRESENT
TREMOR: NO TREMOR
AUDITORY DISTURBANCES: NOT PRESENT
NAUSEA AND VOMITING: NO NAUSEA AND NO VOMITING
SUBSTANCE_ABUSE: 0

## 2019-06-26 ASSESSMENT — ENCOUNTER SYMPTOMS
BRUISES/BLEEDS EASILY: 0
DIZZINESS: 0
DIAPHORESIS: 0
SPEECH CHANGE: 0
FEVER: 0
BACK PAIN: 0
NECK PAIN: 0
CONSTIPATION: 0
PALPITATIONS: 0
CLAUDICATION: 0
SENSORY CHANGE: 0
LOSS OF CONSCIOUSNESS: 0
CHILLS: 0
ABDOMINAL PAIN: 0
COUGH: 0
MEMORY LOSS: 1
NAUSEA: 0
EYE DISCHARGE: 0
EYE PAIN: 0
MYALGIAS: 1
HEADACHES: 0
WEAKNESS: 0
FOCAL WEAKNESS: 0
WHEEZING: 0
SPUTUM PRODUCTION: 0
SORE THROAT: 0
HEMOPTYSIS: 0
DEPRESSION: 0
DIARRHEA: 1
VOMITING: 0
SHORTNESS OF BREATH: 0

## 2019-06-26 ASSESSMENT — PATIENT HEALTH QUESTIONNAIRE - PHQ9
SUM OF ALL RESPONSES TO PHQ9 QUESTIONS 1 AND 2: 0
2. FEELING DOWN, DEPRESSED, IRRITABLE, OR HOPELESS: NOT AT ALL
1. LITTLE INTEREST OR PLEASURE IN DOING THINGS: NOT AT ALL

## 2019-06-26 NOTE — ASSESSMENT & PLAN NOTE
Remembers he takes keppra 500 mg bid.  Continue Keppra   History of TBI 10 years ago with hemorrhage per patient.  Head CT wnl.  No active seizure activity noted.

## 2019-06-26 NOTE — PSYCHIATRY
PSYCHIATRY NOTE: Consult received and assigned to Dr. Talley. Patient will be seen within the next 24 hours. Thank you.

## 2019-06-26 NOTE — CARE PLAN
Problem: Communication  Goal: The ability to communicate needs accurately and effectively will improve  Outcome: PROGRESSING AS EXPECTED  Discussed POC and medications with patient. Pt educated that psych needs to see him prior to discharge because he is on a legal hold. Pt verbalized understanding.      Problem: Safety  Goal: Will remain free from injury  Outcome: PROGRESSING AS EXPECTED  Bed locked and in lowest position. Bed alarm on. Treaded socks provided. Call light and belongings within reach.  Pt on legal hold, Q15min observation. Patient educated to call for assistance. Pt verbalized understanding. Hourly rounding in place.

## 2019-06-26 NOTE — PROGRESS NOTES
Received report from day shift RNElana. Assumed care of pt. Pt reports no pain at this time. Updated pt on plan of care. Pt resting comfortably in bed. Bed alarm inplace. Educated on use of call light. Hourly rounding and continuous monitoring in place. Pt placed on legal hold, call light and telephone not in room, pending psych consult. Q15 obs in place.

## 2019-06-26 NOTE — PROGRESS NOTES
Hospital Medicine Daily Progress Note    Date of Service  6/27/2019    Chief Complaint  48 y.o. male admitted 6/24/2019 with erratic behavior, amnesia brought in by police under legal hold    Hospital Course      48 years old male with past medical history of alcoholism, traumatic brain injury with encephalomalacia admitted June 24 with erratic behavior, amnesia and was placed on a legal hold by [employer] as he was trying to break into a house, and he was wondering barefooted 4 days thinking that he was in [place] looking for his mother's house.  Reportedly he was an an alcohol rehab [facility] in [place] and he left AGAINST MEDICAL ADVICE recently.  He was noticed to have acute kidney injury with rhabdomyolysis and started on.  Psychiatry have been consulted and his legal hold was discontinued.         Interval update  Heart rate mostly 60s-70s, blood pressure within normal limits and saturating well on room air this morning.  Making good urine output, creatinine improved to 0.71 down from 1.82.  CPK still elevated 638, down from 1334.  I will watch his renal function off intravenous fluids.  Had mildly elevated ammonia was initially started on lactulose, patient is currently having a lot of diarrhea, I am stopping   Hepatitis panel negative    Psychiatry evaluated the patient and discontinued his legal hold.  I discussed with case management, requested to check with Police Department if the patient is still in custody or need to go to custodial after discharge.  Discussed with patient, patient's nurse and with multidisciplinary team during rounds including , pharmacist and charge nurse.      Consultants/Specialty  Psychiatry     Code Status  FULL     Disposition  Psychiatry and medical clearance.    Has father in [place] who is willing to drive to [email or web address] to  patient when ready for dc.    Review of Systems  Review of Systems   Constitutional: Positive for malaise/fatigue. Negative for chills,  diaphoresis and fever.   HENT: Negative for congestion and sore throat.    Eyes: Negative for pain and discharge.   Respiratory: Negative for cough, hemoptysis, sputum production, shortness of breath and wheezing.    Cardiovascular: Negative for chest pain, palpitations, claudication and leg swelling.   Gastrointestinal: Positive for diarrhea. Negative for abdominal pain, constipation, melena, nausea and vomiting.   Genitourinary: Negative for dysuria, frequency and urgency.   Musculoskeletal: Positive for myalgias. Negative for back pain, joint pain and neck pain.   Skin: Negative for itching and rash.   Neurological: Negative for dizziness, sensory change, speech change, focal weakness, loss of consciousness, weakness and headaches.   Endo/Heme/Allergies: Does not bruise/bleed easily.   Psychiatric/Behavioral: Positive for memory loss. Negative for depression, substance abuse and suicidal ideas.        Physical Exam  Temp:  [36.2 °C (97.1 °F)-37.2 °C (99 °F)] 36.6 °C (97.8 °F)  Pulse:  [64-85] 79  Resp:  [16-17] 17  BP: (130-151)/(85-93) 151/93  SpO2:  [93 %-98 %] 98 %    Physical Exam   Constitutional: He is oriented to person, place, and time. He appears well-developed and well-nourished. No distress.   HENT:   Head: Normocephalic and atraumatic.   Mouth/Throat: Oropharynx is clear and moist. No oropharyngeal exudate.   Eyes: Pupils are equal, round, and reactive to light. Conjunctivae and EOM are normal. Right eye exhibits no discharge. Left eye exhibits no discharge. No scleral icterus.   Neck: Normal range of motion. Neck supple. No JVD present. No tracheal deviation present. No thyromegaly present.   Cardiovascular: Normal rate, regular rhythm and normal heart sounds.  Exam reveals no gallop and no friction rub.    No murmur heard.  Pulmonary/Chest: Effort normal and breath sounds normal. No respiratory distress. He has no wheezes. He has no rales. He exhibits no tenderness.   Abdominal: Soft. Bowel sounds  are normal. He exhibits no distension and no mass. There is no tenderness. There is no rebound and no guarding.   Musculoskeletal: Normal range of motion. He exhibits no edema or tenderness.   Lymphadenopathy:     He has no cervical adenopathy.   Neurological: He is alert and oriented to person, place, and time. No cranial nerve deficit. He exhibits normal muscle tone.   Skin: Skin is warm and dry. No rash noted. He is not diaphoretic. No erythema.   Psychiatric: He has a normal mood and affect.   Memory loss  Calm and cooperative   Nursing note and vitals reviewed.      Fluids    Intake/Output Summary (Last 24 hours) at 06/27/19 2014  Last data filed at 06/27/19 1406   Gross per 24 hour   Intake              240 ml   Output                0 ml   Net              240 ml       Laboratory  Recent Labs      06/24/19 2018 06/26/19 0345  06/27/19   0354   WBC  12.3*  6.1  7.5   RBC  4.12*  3.17*  3.43*   HEMOGLOBIN  14.5  11.4*  12.1*   HEMATOCRIT  42.7  32.9*  35.3*   MCV  103.6*  103.2*  102.9*   MCH  35.2*  36.0*  35.3*   MCHC  34.0  34.9  34.3   RDW  46.3  44.8  43.3   PLATELETCT  220  187  233   MPV  9.8  9.5  9.5     Recent Labs      06/24/19 2018 06/26/19   0345  06/27/19   0354   SODIUM  130*  131*  131*   POTASSIUM  4.6  3.6  3.8   CHLORIDE  95*  101  101   CO2  21  22  25   GLUCOSE  109*  103*  167*   BUN  42*  9  9   CREATININE  1.82*  0.71  0.81   CALCIUM  9.5  8.3*  8.9     Recent Labs      06/24/19 2018   INR  0.97               Imaging  DX-CHEST-PORTABLE (1 VIEW)   Final Result      No acute cardiopulmonary process is seen.      CT-HEAD W/O   Final Result      1.  Bilateral frontal and RIGHT anterior temporal encephalomalacia most likely secondary to old trauma.   2.  Moderate sized area of encephalomalacia in the high LEFT posterior frontal region which may be due to old trauma or infarct.   3.  Mild diffuse atrophy, greater than expected for age.   4.  No acute intracranial hemorrhage or  territorial infarct.           Assessment/Plan  Tobacco use- (present on admission)   Assessment & Plan    I spent 4 minutes on Tobacco cessation education and counseling.   I Discussed the benefits of quitting smoking and risks of continued smoking including cardiovascular disease, cancer and COPD.   Discussed options of nicotine patch, medical treatment with Chantix [Varenicline].       Seizure disorder (HCC)   Assessment & Plan    Remembers he takes keppra 500 mg bid.  Continue Keppra   History of TBI 10 years ago with hemorrhage per patient.  Head CT wnl.  No active seizure activity noted.     Transaminitis- (present on admission)   Assessment & Plan    Secondary to alcoholism.  Hepatitis panel negative     Hyponatremia   Assessment & Plan    Hypovolemic.  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  History of alcohol abuse  Thiamine            VTE prophylaxis: heparin

## 2019-06-26 NOTE — PSYCHIATRY
BRIEF PSYCHIATRIC CONSULT NOTE: patient seen, full note to follow.  -Legal Hold:invalid    -Orders Placed: routine     -Assessment:  Neurocognitive disorder multiple etiology (TBI, CVA, vascular dementia)  TBI sequela   Hx of hemorraghic stroke  Frontal encephalomalacia  Delirium, improving  Seizure disorder  Alcohol Use Disorder, unspecified severity  Nicotine Use Disorder (1ppd)    -Plan:continue to follow   Abilify 2 mg PO daily for clear thinking  Namenda 5 mg PO daily for clear thinking  Low threshold to initiate CIWA if patient begins to show signs of withdrawal; pt endorses hx of DT

## 2019-06-26 NOTE — PROGRESS NOTES
Called pharmacy to inform of pts incomplete med rec. Med rec pharmacy tech gone for the night. RN attempted to complete however pt does not recall medications.

## 2019-06-26 NOTE — CARE PLAN
Problem: Communication  Goal: The ability to communicate needs accurately and effectively will improve  Outcome: PROGRESSING AS EXPECTED  Pt whiteboard updated on plan of care  Pt encouraged to call for assistance  Pt encouraged to voice any concerns or questions regarding care plan  Pt updated on plan of care as it develops and changes      Problem: Safety  Goal: Will remain free from injury  Outcome: PROGRESSING AS EXPECTED  Treaded socks in use  Call light within reach and patient calls appropriately  Medication education provided prior to administration  Medications administered as ordered  Bed alarm on and bed locked in lowest position

## 2019-06-26 NOTE — PROGRESS NOTES
Dr Pires paged to update of pt's complaint of headache. Received orders for prn tylenol 650 mg q6.

## 2019-06-26 NOTE — PROGRESS NOTES
Bedside report received. Patient A&O x4. RA.  on the monitor. Pt on legal hold, Q15min observation in place until pt is seen by the psychiatrist.  POC discussed with patient. Patient verbalized understanding. Call light and belongings within reach. Bed locked and in lowest position, alarm and fall precautions in place.

## 2019-06-26 NOTE — DISCHARGE PLANNING
Spoke with Jennifer at [facility] at [phone]. Jennifer stated that the patient is free to leave. Bedside nurse notified.

## 2019-06-26 NOTE — PROGRESS NOTES
Spoke again with Dr. Villela d/t still not having specific legal orders and patient privileges, and d/t psych consult not being placed yet. MD placed consult to psych and reported she would be up soon to see the patient. Will continue to monitor.

## 2019-06-26 NOTE — DISCHARGE PLANNING
Care Transition Team Assessment      Spoke with the patient at the bedside.  The patient states that his parents are driving to [place] from [place] to pick him up. The patient is planning on seeing his PCP in [place] for discharge follow up.    Information Source  Orientation : Oriented x 4  Information Given By: Patient         Elopement Risk  Legal Hold: No  Ambulatory or Self Mobile in Wheelchair: Yes  Disoriented: No  Psychiatric Symptoms: None  History of Wandering: No  Elopement this Admit: No  Vocalizing Wanting to Leave: No  Displays Behaviors, Body Language Wanting to Leave: No-Not at Risk for Elopement  Time of Legal Hold: 1930  Date of Legal Hold: 06/24/19  End Time of Legal Hold - SW to complete: 1102  End Date of Legal Hold - SW to complete: 06/26/19  Elopement Risk: Not at Risk for Elopement  Wanderguard On: Unavailable  Personal Belongings: Hospital Clothing Only, Possessions Checked for Security / Elopement Risk, Anything Considered Risk for Security or Elopement, Removed and Stored in Secure Area (Specify Area)  Environmental Precautions: Sharp or Dangerous Items Removed, Dietary Notified for Plastic Utensils / Paperware Only    Interdisciplinary Discharge Planning  Primary Care Physician: Dr. Hsu  Patient or legal guardian wants to designate a caregiver (see row info): No  Support Systems: Parent  Able to Return to Previous ADL's: Yes  Mobility Issues: No  Prior Services: None  Patient Expects to be Discharged to:: home   Assistance Needed: No  Durable Medical Equipment: Not Applicable                                  Domestic Abuse  Have you ever been the victim of abuse or violence?: No         Discharge Risks or Barriers  Discharge risks or barriers?: No

## 2019-06-26 NOTE — CONSULTS
PSYCHIATRIC CONSULTATION:  Reason for admission:    BIB EMS L2K for odd behavior and walking barefoot many miles  Reason for consult:psychosis and altered mental status   Requesting Physician: Dr. Villela  Supervising Physician:Dr. Talley         Legal status:  on hold    Chief Complaint:   Chief Complaint   Patient presents with   • Legal 2000     BIB EMS on legal 2000 for irratic behaviors   • Foot Pain     bilat from walking several miles barefoot       HPI:   48-year-old white male with history of TBI, hemorrhagic stroke, and frontal encephalomalacia, with history of seizure disorder, presented for psychiatric consultation.  Patient reports significant memory impairment and he is unable to provide conclusive timeline of recent events.  Patient believes he had been in [place] for approximately 1 month; patient was able to speak with father who stated he has only left [place] on June 22, 2019.  Patient reports significant alcohol use drinking approximately 24 beers per day, reports history of DT, believes he was only drinking 2 beers the day before medicines to the hospital but he is unclear, patient uncertain if he might of walked significant distance and/or had a seizure prior to admission.  Recommend monitoring of patient and initiation of alcohol withdrawal protocol if necessary.  Will utilize Abilify and Namenda for improved clarity of thought.  Legal hold discontinued as patient symptoms seem to be primarily related to TBI/seizure disorder/alcohol use.    Patient reports that he is unable to care for himself this is evidenced by: His inability to organize his thought process sufficient to locate his vehicle which she believes is either stolen or impounded, find shelter, contact family to let them know where he was, have awareness of time (patient believes he had been in [place] for approximately 1 month patient states that he was able to speak with father who reports he left [place] only on June 22, 2019), remembers  to take his appropriate medications, patient reports having recent seizures and also driving, impulsivity as evidenced by suddenly leaving [place], patient unable to state why he left [place]; patient verbally able to state he should call insurance company about his car but he has been unable to organize himself in order to do so.  Patient was admitted to the hospital significantly physically impaired related to his inability to take care of his physical needs running ADLs and IADLs.    Patient endorses that this is not the first time he has found himself lost in a city in which he did not know he was in.      Psychiatric Review of Systems:current symptoms as reported by pt.  Depression:      Denies depressed mood  Susanne: Denies history of susanne including decreased need for sleep, hyper motivation, rapid speech, change in behavior  Anxiety/Panic Attacks denies feeling excessively worrisome, denies panic attack, denies social anxiety  PTSD symptom: Endorses traumatic experience when he had a stroke and had to relearn to walk and talk, denies that this causes intrusive memory, hypervigilance, paranoia, avoidance, change in mood  Psychosis: Endorses what sounds like history of DT brief periods of hallucinations disorientation few days after he stops drinking; denies auditory visual hallucinations, paranoia, psychosis outside of withdrawal  Other: Endorses significant memory impairment in his working memory in order to hold task is mild long enough for completion, short-term memory as he is unable to repeat recent events, long-term memory unable to state year or age when TBI or CVA happened; patient endorses significant impulsivity since TBI       Medical Review of Systems: as reported by pt. All systems reviewed. Only those found to be + are noted below. All others are negative.  Endorses bilateral leg pain, difficulty walking with antalgic gait, history of headaches but none currently, patient endorses dry mouth, dry  "eyes  Neurological:    TBIs: yes   SZs: Yes   Strokes: Yes   Other:  Other medical symptoms:   Thyroid: Denies   Diabetes: Denies   Cardiovascular disease: Denies    Psychiatric Examination: observed phenomenon:  Vitals:   Vitals:    06/26/19 0400   BP: 128/82   Pulse: 71   Resp: 17   Temp: 36.8 °C (98.3 °F)   SpO2: 95%     Appearance: Average weight white male who appears clean and hygienic in hospital attire, appears stated age,  Motor: Patient has abnormal blink rate, no tics/tremors/stereotyped behavior, patient walks with antalgic gait but is able to transfer from bed and walk without assistance  Speech: Clear, fluent English without slurring, mumbling, or speech impediment; normal rate, prosody, tone, volume, and inflection; appropriate content  Thought Process: Disorganized to recent timeline, appropriate responses to direct question, not goal oriented  No loosening of association  Thought Content: Denies auditory visual hallucinations; not seem to respond to internal stimuli; no paranoia or delusions noted  Insight/Judgement: Fair/limited  Orientation: Patient is alert and oriented to person, place, time, situation  Memory: Patient appears to have impairment in working memory, short-term memory, long-term memory see HPI  Attention/Concentration: Intact to conversation  Fund of Knowledge: Below average based on syntax  Mood: \"All right\"  Affect:          Congruent, euthymic, non-tearful, non-labile  SI/HI:   Denies suicidal homicidal ideation  Neurological Testing:( ie clock, cube drawing, MMSE, MOCA,etc.) patient is able to spell world backwards, patient is able to perform serial sevens 4-5 correct, patient abstract thought intact similarities categories, patient able to explain cliché, patient endorses impulsivity such as not knowing why he left [place],     Past Psychiatric Hx:   Outpatient: Patient denies prior psychiatric evaluation  Inpatient: Patient denies prior psychiatric inpatient  Past Rx: Denies " "prior psychotropic  Current Outpatient Rx: Patient denies current psychotropic  Family Psychiatric Hx:  Patient endorses possibly a cousin who benefited from Abilify; patient states father has been compiling family timeline and believes there could have been some of schizophrenia back in the 1800s in family   Social Hx:  Patient reports that he has an apartment in [facility]  Patient reports that he had been staying with parents in [place], [place]  Patient states he does \"[employer]\" for work  Patient states he should probably apply for Social Security disability but has never been organized enough to do so  Patient states father is clinical psychologist      Drug/Alcohol/Tobacco Hx:   Drugs: Patient denies routine marijuana use states none in the last month: Denies meth/cocaine/heroin/IV drug/other illicit   Alcohol: Patient endorses approximately 24 beers per day, patient states he believes he only drink 2 beers a day for he was admitted, patient endorses history of DT with withdrawal, patient endorses history of seizure with withdrawal   Tobacco: Patient endorses smoking 1 pack/day, patient endorses having nicotine craving during interview, nicotine replacement ordered     Medical Hx: labs, MARS, medications, etc were reviewed. Only those findings of potential interest to psychiatry are noted below:  Medical Conditions:   No past medical history on file.  Allergies: Patient has no known allergies.  Medications (Mar48):   Medications Administered in Last 48 Hours from 06/24/2019 0919 to 06/26/2019 0919     Date/Time Order Dose Route Action Comments    06/25/2019 0018 NS infusion 1,000 mL 0 mL Intravenous Stopped     06/24/2019 2130 NS infusion 1,000 mL 1,000 mL Intravenous New Bag     06/25/2019 0222 NS infusion 1,000 mL 0 mL Intravenous Stopped     06/25/2019 0100 NS infusion 1,000 mL 1,000 mL Intravenous New Bag     06/25/2019 0100 LORazepam (ATIVAN) tablet 1 mg   Oral Canceled Entry     06/25/2019 0120 " LORazepam (ATIVAN) injection 1 mg 1 mg Intravenous Given     06/25/2019 0754 detox IV 1000 mL (D5LR + magnesium 1 g + thiamine 100 mg + folic acid 1 mg) infusion 0  Intravenous Stopped     06/25/2019 0215 detox IV 1000 mL (D5LR + magnesium 1 g + thiamine 100 mg + folic acid 1 mg) infusion   Intravenous New Bag     06/26/2019 0600 senna-docusate (PERICOLACE or SENOKOT S) 8.6-50 MG per tablet 2 Tab 0 Tab Oral Held pt already w/ looser stools and receiving lactulose    06/25/2019 1800 senna-docusate (PERICOLACE or SENOKOT S) 8.6-50 MG per tablet 2 Tab 0 Tab Oral Held loose stool    06/25/2019 0600 senna-docusate (PERICOLACE or SENOKOT S) 8.6-50 MG per tablet 2 Tab 2 Tab Oral Missed pt in ED    06/25/2019 1100 NS infusion 4,000 mL 0 mL Intravenous Stopped     06/25/2019 0400 NS infusion 4,000 mL 4,000 mL Intravenous New Bag     06/26/2019 0546 heparin injection 5,000 Units 5,000 Units Subcutaneous Given     06/25/2019 2017 heparin injection 5,000 Units 5,000 Units Subcutaneous Given     06/25/2019 1315 heparin injection 5,000 Units 5,000 Units Subcutaneous Given     06/25/2019 0600 heparin injection 5,000 Units 5,000 Units Subcutaneous Missed patient in ED    06/26/2019 0546 thiamine tablet 100 mg 100 mg Oral Given     06/25/2019 0600 thiamine tablet 100 mg 100 mg Oral Missed pt in ED    06/26/2019 0546 multivitamin (THERAGRAN) tablet 1 Tab 1 Tab Oral Given     06/25/2019 0600 multivitamin (THERAGRAN) tablet 1 Tab 1 Tab Oral Missed pt in ED    06/26/2019 0546 folic acid (FOLVITE) tablet 1 mg 1 mg Oral Given     06/25/2019 0600 folic acid (FOLVITE) tablet 1 mg 1 mg Oral Missed pt in ED    06/26/2019 0812 NS infusion   Intravenous New Bag     06/26/2019 0708 NS infusion   Intravenous Rate Change     06/26/2019 0127 NS infusion   Intravenous New Bag     06/25/2019 1839 NS infusion   Intravenous New Bag     06/25/2019 1145 NS infusion   Intravenous New Bag     06/26/2019 0544 lactulose 20 GM/30ML solution 30 mL 30 mL Oral  Given     06/25/2019 1702 lactulose 20 GM/30ML solution 30 mL 30 mL Oral Given     06/25/2019 1145 lactulose 20 GM/30ML solution 30 mL 30 mL Oral Given     06/26/2019 0544 levETIRAcetam (KEPPRA) tablet 500 mg 500 mg Oral Given     06/25/2019 2017 levETIRAcetam (KEPPRA) tablet 500 mg 500 mg Oral Given     06/25/2019 0069 acetaminophen (TYLENOL) tablet 650 mg 650 mg Oral Given         Labs:   Recent Results (from the past 72 hour(s))   CBC WITH DIFFERENTIAL    Collection Time: 06/24/19  8:18 PM   Result Value Ref Range    WBC 12.3 (H) 4.8 - 10.8 K/uL    RBC 4.12 (L) 4.70 - 6.10 M/uL    Hemoglobin 14.5 14.0 - 18.0 g/dL    Hematocrit 42.7 42.0 - 52.0 %    .6 (H) 81.4 - 97.8 fL    MCH 35.2 (H) 27.0 - 33.0 pg    MCHC 34.0 33.7 - 35.3 g/dL    RDW 46.3 35.9 - 50.0 fL    Platelet Count 220 164 - 446 K/uL    MPV 9.8 9.0 - 12.9 fL    Neutrophils-Polys 82.10 (H) 44.00 - 72.00 %    Lymphocytes 4.60 (L) 22.00 - 41.00 %    Monocytes 11.50 0.00 - 13.40 %    Eosinophils 0.50 0.00 - 6.90 %    Basophils 0.20 0.00 - 1.80 %    Immature Granulocytes 1.10 (H) 0.00 - 0.90 %    Nucleated RBC 0.00 /100 WBC    Neutrophils (Absolute) 10.11 (H) 1.82 - 7.42 K/uL    Lymphs (Absolute) 0.57 (L) 1.00 - 4.80 K/uL    Monos (Absolute) 1.41 (H) 0.00 - 0.85 K/uL    Eos (Absolute) 0.06 0.00 - 0.51 K/uL    Baso (Absolute) 0.03 0.00 - 0.12 K/uL    Immature Granulocytes (abs) 0.13 (H) 0.00 - 0.11 K/uL    NRBC (Absolute) 0.00 K/uL   Comp Metabolic Panel    Collection Time: 06/24/19  8:18 PM   Result Value Ref Range    Sodium 130 (L) 135 - 145 mmol/L    Potassium 4.6 3.6 - 5.5 mmol/L    Chloride 95 (L) 96 - 112 mmol/L    Co2 21 20 - 33 mmol/L    Anion Gap 14.0 (H) 0.0 - 11.9    Glucose 109 (H) 65 - 99 mg/dL    Bun 42 (H) 8 - 22 mg/dL    Creatinine 1.82 (H) 0.50 - 1.40 mg/dL    Calcium 9.5 8.5 - 10.5 mg/dL    AST(SGOT) 95 (H) 12 - 45 U/L    ALT(SGPT) 89 (H) 2 - 50 U/L    Alkaline Phosphatase 79 30 - 99 U/L    Total Bilirubin 1.7 (H) 0.1 - 1.5 mg/dL     Albumin 4.9 3.2 - 4.9 g/dL    Total Protein 7.7 6.0 - 8.2 g/dL    Globulin 2.8 1.9 - 3.5 g/dL    A-G Ratio 1.8 g/dL   DIAGNOSTIC ALCOHOL    Collection Time: 06/24/19  8:18 PM   Result Value Ref Range    Diagnostic Alcohol 0.01 (H) 0.00 g/dL   CREATINE KINASE    Collection Time: 06/24/19  8:18 PM   Result Value Ref Range    CPK Total 1334 (H) 0 - 154 U/L   PT/INR    Collection Time: 06/24/19  8:18 PM   Result Value Ref Range    PT 13.1 12.0 - 14.6 sec    INR 0.97 0.87 - 1.13   ESTIMATED GFR    Collection Time: 06/24/19  8:18 PM   Result Value Ref Range    GFR If  48 (A) >60 mL/min/1.73 m 2    GFR If Non African American 40 (A) >60 mL/min/1.73 m 2   MAGNESIUM    Collection Time: 06/24/19  8:18 PM   Result Value Ref Range    Magnesium 2.5 1.5 - 2.5 mg/dL   URINE DRUG SCREEN    Collection Time: 06/24/19 11:53 PM   Result Value Ref Range    Amphetamines Urine Negative Negative    Barbiturates Negative Negative    Benzodiazepines Negative Negative    Cocaine Metabolite Negative Negative    Methadone Negative Negative    Opiates Negative Negative    Oxycodone Negative Negative    Phencyclidine -Pcp Negative Negative    Propoxyphene Negative Negative    Cannabinoid Metab Negative Negative   URINALYSIS    Collection Time: 06/24/19 11:53 PM   Result Value Ref Range    Color Yellow     Character Clear     Specific Gravity 1.024 <1.035    Ph 5.5 5.0 - 8.0    Glucose Negative Negative mg/dL    Ketones 15 (A) Negative mg/dL    Protein Negative Negative mg/dL    Bilirubin Negative Negative    Urobilinogen, Urine 0.2 Negative    Nitrite Negative Negative    Leukocyte Esterase Negative Negative    Occult Blood Trace (A) Negative    Micro Urine Req Microscopic    URINE MICROSCOPIC (W/UA)    Collection Time: 06/24/19 11:53 PM   Result Value Ref Range    WBC 0-2 (A) /hpf    RBC 0-2 (A) /hpf    Bacteria Negative None /hpf    Epithelial Cells Negative /hpf    Hyaline Cast 3-5 (A) /lpf   OSMOLALITY URINE    Collection  Time: 06/24/19 11:53 PM   Result Value Ref Range    Osmolality Urine 726 300 - 900 mOsm/kg H2O   URINE PROTEIN RANDOM    Collection Time: 06/24/19 11:53 PM   Result Value Ref Range    Total Protein, Urine 29.1 (H) 0.0 - 15.0 mg/dL   URINE CREATININE RANDOM    Collection Time: 06/24/19 11:53 PM   Result Value Ref Range    Creatinine, Random Urine 185.20 mg/dL   URINE CHLORIDE RANDOM    Collection Time: 06/24/19 11:53 PM   Result Value Ref Range    Chloride, Urine-per volume <15 mmol/L   URINE POTASSIUM RANDOM    Collection Time: 06/24/19 11:53 PM   Result Value Ref Range    Potassium 60.9 mmol/L   URINE SODIUM RANDOM    Collection Time: 06/24/19 11:53 PM   Result Value Ref Range    Sodium, Urine -per volume 21 mmol/L   AMMONIA    Collection Time: 06/25/19  3:45 AM   Result Value Ref Range    Ammonia 58 (H) 11 - 45 umol/L   TROPONIN    Collection Time: 06/25/19  3:45 AM   Result Value Ref Range    Troponin I 0.02 0.00 - 0.04 ng/mL   OSMOLALITY SERUM    Collection Time: 06/25/19  3:45 AM   Result Value Ref Range    Osmolality Serum 278 278 - 298 mOsm/kg H2O   HEPATITIS PANEL ACUTE(4 COMPONENTS)    Collection Time: 06/25/19  3:45 AM   Result Value Ref Range    Hepatitis B Surface Antigen Negative Negative    Hepatitis B Cors Ab,IgM Negative Negative    Hepatitis A Virus Ab, IgM Negative Negative    Hepatitis C Antibody Negative Negative   BLOOD CULTURE    Collection Time: 06/25/19 11:33 AM   Result Value Ref Range    Significant Indicator NEG     Source BLD     Site PERIPHERAL     Culture Result       No Growth  Note: Blood cultures are incubated for 5 days and  are monitored continuously.Positive blood cultures  are called to the RN and reported as soon as  they are identified.     BLOOD CULTURE    Collection Time: 06/25/19 11:33 AM   Result Value Ref Range    Significant Indicator NEG     Source BLD     Site PERIPHERAL     Culture Result       No Growth  Note: Blood cultures are incubated for 5 days and  are monitored  continuously.Positive blood cultures  are called to the RN and reported as soon as  they are identified.     CBC without Differential    Collection Time: 06/26/19  3:45 AM   Result Value Ref Range    WBC 6.1 4.8 - 10.8 K/uL    RBC 3.17 (L) 4.70 - 6.10 M/uL    Hemoglobin 11.4 (L) 14.0 - 18.0 g/dL    Hematocrit 32.9 (L) 42.0 - 52.0 %    .2 (H) 81.4 - 97.8 fL    MCH 36.0 (H) 27.0 - 33.0 pg    MCHC 34.9 33.7 - 35.3 g/dL    RDW 44.8 35.9 - 50.0 fL    Platelet Count 187 164 - 446 K/uL    MPV 9.5 9.0 - 12.9 fL   Comp Metabolic Panel (CMP)    Collection Time: 06/26/19  3:45 AM   Result Value Ref Range    Sodium 131 (L) 135 - 145 mmol/L    Potassium 3.6 3.6 - 5.5 mmol/L    Chloride 101 96 - 112 mmol/L    Co2 22 20 - 33 mmol/L    Anion Gap 8.0 0.0 - 11.9    Glucose 103 (H) 65 - 99 mg/dL    Bun 9 8 - 22 mg/dL    Creatinine 0.71 0.50 - 1.40 mg/dL    Calcium 8.3 (L) 8.5 - 10.5 mg/dL    AST(SGOT) 66 (H) 12 - 45 U/L    ALT(SGPT) 69 (H) 2 - 50 U/L    Alkaline Phosphatase 67 30 - 99 U/L    Total Bilirubin 0.7 0.1 - 1.5 mg/dL    Albumin 3.2 3.2 - 4.9 g/dL    Total Protein 5.3 (L) 6.0 - 8.2 g/dL    Globulin 2.1 1.9 - 3.5 g/dL    A-G Ratio 1.5 g/dL   CREATINE KINASE    Collection Time: 06/26/19  3:45 AM   Result Value Ref Range    CPK Total 638 (H) 0 - 154 U/L   Magnesium    Collection Time: 06/26/19  3:45 AM   Result Value Ref Range    Magnesium 1.7 1.5 - 2.5 mg/dL   Phosphorus    Collection Time: 06/26/19  3:45 AM   Result Value Ref Range    Phosphorus 2.9 2.5 - 4.5 mg/dL   VITAMIN B12    Collection Time: 06/26/19  3:45 AM   Result Value Ref Range    Vitamin B12 -True Cobalamin 504 211 - 911 pg/mL   ESTIMATED GFR    Collection Time: 06/26/19  3:45 AM   Result Value Ref Range    GFR If African American >60 >60 mL/min/1.73 m 2    GFR If Non African American >60 >60 mL/min/1.73 m 2       ECG: QTc=  None in Epic to review    Cranial Imaging: reviewed    CT head 6/25/19    1.  Bilateral frontal and RIGHT anterior temporal  encephalomalacia most likely secondary to old trauma.  2.  Moderate sized area of encephalomalacia in the high LEFT posterior frontal region which may be due to old trauma or infarct.  3.  Mild diffuse atrophy, greater than expected for age.  4.  No acute intracranial hemorrhage or territorial infarct        ASSESSMENT:   Neurocognitive disorder multiple etiology (TBI, CVA, vascular dementia)  TBI sequela   Hx of hemorraghic stroke  Frontal encephalomalacia  Delirium, improving  Seizure disorder  Alcohol Use Disorder, unspecified severity  Nicotine Use Disorder (1ppd)    Patient does not have capacity to leave AMA at this time due to neurocognitive impairments which limit his ability to weigh the pros/cons and make a informed decision about leaving AMA. I discussed the lack of capacity to leave AMA with the patient. He will need social support and coordination with family for safe/appropriate discharge.     48-year-old white male with history of TBI, hemorrhagic stroke, and frontal encephalomalacia, with history of seizure disorder, presented for psychiatric consultation.  Patient reports significant memory impairment and he is unable to provide conclusive timeline of recent events.  Patient believes he had been in [place] for approximately 1 month; patient was able to speak with father who stated he has only left [place] on June 22, 2019.  Patient reports significant alcohol use drinking approximately 24 beers per day, reports history of DT, believes he was only drinking 2 beers the day before medicines to the hospital but he is unclear, patient uncertain if he might of walked significant distance and/or had a seizure prior to admission.  Recommend monitoring of patient and initiation of alcohol withdrawal protocol if necessary.  Will utilize Abilify and Namenda for improved clarity of thought.  Legal hold discontinued as patient symptoms seem to be primarily related to TBI/seizure disorder/alcohol use.        Suicide  Risk Evaluation:  Chronic: elevated due to hx of TBI/CVA  Acute: baseline, pt denies SI, hx of Suicide attempt    PLAN:  Recommend sobriety resources when appropriate be provided to the patient   Legal status: discontinued  Psychotropics: Abilify 2 mg PO daily; Namenda 5 mg PO daily  PRN Medications: nicotine replacement 1 ppd  Anticipate Follow up  YES  .   Labs/tests ordered:YES    Records reviewed:YES    Discussed patient with other provider:    Dr. Talley        Thank you for the consult.   Will follow
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
## 2019-06-27 ENCOUNTER — PATIENT OUTREACH (OUTPATIENT)
Dept: HEALTH INFORMATION MANAGEMENT | Facility: OTHER | Age: 49
End: 2019-06-27

## 2019-06-27 VITALS
DIASTOLIC BLOOD PRESSURE: 93 MMHG | HEART RATE: 79 BPM | OXYGEN SATURATION: 98 % | TEMPERATURE: 97.8 F | RESPIRATION RATE: 17 BRPM | WEIGHT: 190.7 LBS | HEIGHT: 72 IN | SYSTOLIC BLOOD PRESSURE: 151 MMHG | BODY MASS INDEX: 25.83 KG/M2

## 2019-06-27 PROBLEM — Z72.0 TOBACCO USE: Status: ACTIVE | Noted: 2019-06-27

## 2019-06-27 PROBLEM — M62.82 RHABDOMYOLYSIS: Status: RESOLVED | Noted: 2019-06-25 | Resolved: 2019-06-27

## 2019-06-27 PROBLEM — N17.9 AKI (ACUTE KIDNEY INJURY) (HCC): Status: RESOLVED | Noted: 2019-06-25 | Resolved: 2019-06-27

## 2019-06-27 PROBLEM — G93.40 ENCEPHALOPATHY: Status: RESOLVED | Noted: 2019-06-25 | Resolved: 2019-06-27

## 2019-06-27 LAB
ALBUMIN SERPL BCP-MCNC: 3.5 G/DL (ref 3.2–4.9)
ALBUMIN/GLOB SERPL: 1.5 G/DL
ALP SERPL-CCNC: 80 U/L (ref 30–99)
ALT SERPL-CCNC: 115 U/L (ref 2–50)
ANION GAP SERPL CALC-SCNC: 5 MMOL/L (ref 0–11.9)
AST SERPL-CCNC: 79 U/L (ref 12–45)
BILIRUB SERPL-MCNC: 0.4 MG/DL (ref 0.1–1.5)
BUN SERPL-MCNC: 9 MG/DL (ref 8–22)
CALCIUM SERPL-MCNC: 8.9 MG/DL (ref 8.5–10.5)
CHLORIDE SERPL-SCNC: 101 MMOL/L (ref 96–112)
CK SERPL-CCNC: 626 U/L (ref 0–154)
CO2 SERPL-SCNC: 25 MMOL/L (ref 20–33)
CREAT SERPL-MCNC: 0.81 MG/DL (ref 0.5–1.4)
ERYTHROCYTE [DISTWIDTH] IN BLOOD BY AUTOMATED COUNT: 43.3 FL (ref 35.9–50)
GLOBULIN SER CALC-MCNC: 2.3 G/DL (ref 1.9–3.5)
GLUCOSE SERPL-MCNC: 167 MG/DL (ref 65–99)
HCT VFR BLD AUTO: 35.3 % (ref 42–52)
HGB BLD-MCNC: 12.1 G/DL (ref 14–18)
MCH RBC QN AUTO: 35.3 PG (ref 27–33)
MCHC RBC AUTO-ENTMCNC: 34.3 G/DL (ref 33.7–35.3)
MCV RBC AUTO: 102.9 FL (ref 81.4–97.8)
PLATELET # BLD AUTO: 233 K/UL (ref 164–446)
PMV BLD AUTO: 9.5 FL (ref 9–12.9)
POTASSIUM SERPL-SCNC: 3.8 MMOL/L (ref 3.6–5.5)
PROT SERPL-MCNC: 5.8 G/DL (ref 6–8.2)
RBC # BLD AUTO: 3.43 M/UL (ref 4.7–6.1)
SODIUM SERPL-SCNC: 131 MMOL/L (ref 135–145)
WBC # BLD AUTO: 7.5 K/UL (ref 4.8–10.8)

## 2019-06-27 PROCEDURE — A9270 NON-COVERED ITEM OR SERVICE: HCPCS | Performed by: HOSPITALIST

## 2019-06-27 PROCEDURE — 700102 HCHG RX REV CODE 250 W/ 637 OVERRIDE(OP): Performed by: STUDENT IN AN ORGANIZED HEALTH CARE EDUCATION/TRAINING PROGRAM

## 2019-06-27 PROCEDURE — 700102 HCHG RX REV CODE 250 W/ 637 OVERRIDE(OP): Performed by: HOSPITALIST

## 2019-06-27 PROCEDURE — 36415 COLL VENOUS BLD VENIPUNCTURE: CPT

## 2019-06-27 PROCEDURE — 700111 HCHG RX REV CODE 636 W/ 250 OVERRIDE (IP): Performed by: HOSPITALIST

## 2019-06-27 PROCEDURE — 85027 COMPLETE CBC AUTOMATED: CPT

## 2019-06-27 PROCEDURE — 80053 COMPREHEN METABOLIC PANEL: CPT

## 2019-06-27 PROCEDURE — A9270 NON-COVERED ITEM OR SERVICE: HCPCS | Performed by: STUDENT IN AN ORGANIZED HEALTH CARE EDUCATION/TRAINING PROGRAM

## 2019-06-27 PROCEDURE — 82550 ASSAY OF CK (CPK): CPT

## 2019-06-27 PROCEDURE — 99239 HOSP IP/OBS DSCHRG MGMT >30: CPT | Performed by: HOSPITALIST

## 2019-06-27 PROCEDURE — 99232 SBSQ HOSP IP/OBS MODERATE 35: CPT | Mod: GC | Performed by: PSYCHIATRY & NEUROLOGY

## 2019-06-27 RX ORDER — LEVETIRACETAM 500 MG/1
500 TABLET ORAL 2 TIMES DAILY
Qty: 60 TAB | Refills: 0 | Status: SHIPPED | OUTPATIENT
Start: 2019-06-27

## 2019-06-27 RX ORDER — ARIPIPRAZOLE 2 MG/1
2 TABLET ORAL DAILY
Qty: 30 TAB | Refills: 0 | Status: SHIPPED | OUTPATIENT
Start: 2019-06-28

## 2019-06-27 RX ORDER — MEMANTINE HYDROCHLORIDE 5 MG/1
5 TABLET ORAL DAILY
Qty: 30 TAB | Refills: 0 | Status: SHIPPED | OUTPATIENT
Start: 2019-06-28

## 2019-06-27 RX ADMIN — SENNOSIDES AND DOCUSATE SODIUM 2 TABLET: 8.6; 5 TABLET ORAL at 05:21

## 2019-06-27 RX ADMIN — NICOTINE 21 MG: 21 PATCH, EXTENDED RELEASE TRANSDERMAL at 05:21

## 2019-06-27 RX ADMIN — LEVETIRACETAM 500 MG: 500 TABLET ORAL at 17:01

## 2019-06-27 RX ADMIN — ARIPIPRAZOLE 2 MG: 2 TABLET ORAL at 05:25

## 2019-06-27 RX ADMIN — HEPARIN SODIUM 5000 UNITS: 5000 INJECTION, SOLUTION INTRAVENOUS; SUBCUTANEOUS at 05:22

## 2019-06-27 RX ADMIN — FOLIC ACID 1 MG: 1 TABLET ORAL at 05:22

## 2019-06-27 RX ADMIN — THERA TABS 1 TABLET: TAB at 05:21

## 2019-06-27 RX ADMIN — Medication 100 MG: at 05:22

## 2019-06-27 RX ADMIN — MEMANTINE HYDROCHLORIDE 5 MG: 10 TABLET ORAL at 05:22

## 2019-06-27 RX ADMIN — LEVETIRACETAM 500 MG: 500 TABLET ORAL at 05:22

## 2019-06-27 NOTE — CARE PLAN
Problem: Communication  Goal: The ability to communicate needs accurately and effectively will improve  Outcome: PROGRESSING AS EXPECTED      Problem: Safety  Goal: Will remain free from injury  Outcome: PROGRESSING AS EXPECTED  Safety measures informed and educated. Hourly rounding.    Problem: Venous Thromboembolism (VTW)/Deep Vein Thrombosis (DVT) Prevention:  Goal: Patient will participate in Venous Thrombosis (VTE)/Deep Vein Thrombosis (DVT)Prevention Measures  Outcome: PROGRESSING AS EXPECTED      Problem: Discharge Barriers/Planning  Goal: Patient's continuum of care needs will be met  Outcome: PROGRESSING AS EXPECTED

## 2019-06-27 NOTE — PROGRESS NOTES
Resume care of patient. Patient sitting on chair no distress noted at this time. Bedside report/POC received from DOYLE Smiley.  Bed locked in lowest position and call light within reach.

## 2019-06-27 NOTE — PSYCHIATRY
BRIEF PSYCHIATRIC CONSULT NOTE: patient seen, full note to follow.  -Legal Hold:voluntary    -Orders Placed:   Recommend continuing Abilify and Namenda     -Assessment:  Neurocognitive disorder multiple etiology (TBI, CVA, vascular dementia)  TBI sequela   Hx of hemorraghic stroke  Frontal encephalomalacia  Delirium, improving  Seizure disorder  Alcohol Use Disorder, unspecified severity  Nicotine Use Disorder (1ppd)    -Plan:  Patient and doctor report that he is discharging this afternoon to care of his father with plans to return to his outpatient providers in [place].

## 2019-06-27 NOTE — DISCHARGE SUMMARY
Discharge Summary    CHIEF COMPLAINT ON ADMISSION  Chief Complaint   Patient presents with   • Legal 2000     BIB EMS on legal 2000 for irratic behaviors   • Foot Pain     bilat from walking several miles barefoot       Reason for Admission  Altered mental status     Admission Date  6/24/2019    CODE STATUS  Full Code    HPI & HOSPITAL COURSE  48 years old male with past medical history of alcoholism, traumatic brain injury with encephalomalacia admitted June 24 with erratic behavior, amnesia and was placed on a legal hold by [employer] as he was trying to break into a house, and he was wondering barefooted 4 days thinking that he was in [place] looking for his mother's house.  Reportedly he was an an alcohol rehab [place] in [place] and he left AGAINST MEDICAL ADVICE recently.  CT of the head shows bilateral frontal, and right anterior temporal encephalomalacia consistent with old trauma.  Moderate-sized area of encephalomalacia in the left posterior frontal region also consistent with old trauma or old infarction.  No acute hemorrhage or acute infarction was found. He was noticed to have acute kidney injury with rhabdomyolysis and started on intravenous fluids.  Psychiatry have been consulted and his legal hold was discontinued.  Renal function improved with intravenous fluids.  Creatinine was 1.82, and trended down to 0.81.  His CPK was 1334 on admission and trended down to 626.  Patient's encephalopathy improved, he was evaluated by psychiatry who removed his legal hold and was started on memantine and aripiprazole to follow-up with his psychiatrist in [place], his father will pick him up.   Therefore, he is discharged in fair and stable condition to home with close outpatient follow-up.  The patient recovered much more quickly than anticipated on admission.    Discharge Date  6/27/2019     FOLLOW UP ITEMS POST DISCHARGE  Follow-up with primary care within 5 days.  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  Follow-up with psychiatry within a week  DR HESTER      PLEASE CALL PCP OFFICE DIRECTLY TO ARRANGE YOUR FOLLOW UP APPOINTMENT. THNK YOU      MEDICATIONS ON DISCHARGE     Medication List      START taking these medications      Instructions   ARIPiprazole 2 MG tablet  Start taking on:  6/28/2019  Commonly known as:  ABILIFY   Take 1 Tab by mouth every day.  Dose:  2 mg     memantine 5 MG Tabs  Start taking on:  6/28/2019  Commonly known as:  NAMENDA   Take 1 Tab by mouth every day.  Dose:  5 mg        CONTINUE taking these medications      Instructions   levETIRAcetam 500 MG Tabs  Commonly known as:  KEPPRA   Take 1 Tab by mouth 2 times a day.  Dose:  500 mg     therapeutic multivitamin-minerals Tabs   Take 1 Tab by mouth every day.  Dose:  1 Tab            Allergies  No Known Allergies    DIET  Orders Placed This Encounter   Procedures   • Diet Order Regular     Standing Status:   Standing     Number of Occurrences:   1     Order Specific Question:   Diet:     Answer:   Regular [1]       ACTIVITY  As tolerated.  Weight bearing as tolerated    CONSULTATIONS  Psychiatry    PROCEDURES  None     LABORATORY  Lab Results   Component Value Date    SODIUM 131 (L) 06/27/2019    POTASSIUM 3.8 06/27/2019    CHLORIDE 101 06/27/2019    CO2 25 06/27/2019    GLUCOSE 167 (H) 06/27/2019    BUN 9 06/27/2019    CREATININE 0.81 06/27/2019        Lab Results   Component Value Date    WBC 7.5 06/27/2019    HEMOGLOBIN 12.1 (L) 06/27/2019    HEMATOCRIT 35.3 (L) 06/27/2019    PLATELETCT 233 06/27/2019         Total time of the discharge process exceeds 35 minutes.

## 2019-06-27 NOTE — DISCHARGE INSTRUCTIONS
Discharge Instructions    Discharged to home by car with relative. Discharged via wheelchair, hospital escort: Yes.  Special equipment needed: Not Applicable    Be sure to schedule a follow-up appointment with your primary care doctor or any specialists as instructed.     Discharge Plan:   Diet Plan: Discussed  Activity Level: Discussed  Confirmed Follow up Appointment: Patient to Call and Schedule Appointment  Confirmed Symptoms Management: Discussed  Medication Reconciliation Updated: Yes  Pneumococcal Vaccine Administered/Refused: Not given - Patient refused pneumococcal vaccine  Influenza Vaccine Indication: Patient Refuses    I understand that a diet low in cholesterol, fat, and sodium is recommended for good health. Unless I have been given specific instructions below for another diet, I accept this instruction as my diet prescription.     Special Instructions: None    · Is patient discharged on Warfarin / Coumadin?   No     Depression / Suicide Risk    As you are discharged from this [facility] facility, it is important to learn how to keep safe from harming yourself.    Recognize the warning signs:  · Abrupt changes in personality, positive or negative- including increase in energy   · Giving away possessions  · Change in eating patterns- significant weight changes-  positive or negative  · Change in sleeping patterns- unable to sleep or sleeping all the time   · Unwillingness or inability to communicate  · Depression  · Unusual sadness, discouragement and loneliness  · Talk of wanting to die  · Neglect of personal appearance   · Rebelliousness- reckless behavior  · Withdrawal from people/activities they love  · Confusion- inability to concentrate     If you or a loved one observes any of these behaviors or has concerns about self-harm, here's what you can do:  · Talk about it- your feelings and reasons for harming yourself  · Remove any means that you might use to hurt yourself (examples: pills, rope,  extension cords, firearm)  · Get professional help from the community (Mental Health, Substance Abuse, psychological counseling)  · Do not be alone:Call your Safe Contact- someone whom you trust who will be there for you.  · Call your local CRISIS HOTLINE 972-6107 or 636-795-9707  · Call your local Children's Mobile Crisis Response Team [place] (830) 817-9791 or www.Virobay  · Call the toll free National Suicide Prevention Hotlines   · National Suicide Prevention Lifeline 789-441-KZNA (4806)  · [employer] 800-SUICIDE (646-7657)        Rhabdomyolysis  Rhabdomyolysis is a condition that happens when muscle cells break down and release substances into the blood that can damage the kidneys. This condition happens because of damage to the muscles that move bones (skeletal muscle). When the skeletal muscles are damaged, substances inside the muscle cells go into the blood. One of these substances is a protein called myoglobin.  Large amounts of myoglobin can cause kidney damage or kidney failure. Other substances that are released by muscle cells may upset the balance of the minerals (electrolytes) in your blood. This imbalance causes your blood to have too much acid (acidosis).  What are the causes?  This condition is caused by muscle damage. Muscle damage often happens because of:  · Using your muscles too much.  · An injury that crushes or squeezes a muscle too tightly.  · Using illegal drugs, mainly cocaine.  · Alcohol abuse.  Other possible causes include:  · Prescription medicines, such as those that:  ¨ Lower cholesterol (statins).  ¨ Treat ADHD (attention deficit hyperactivity disorder) or help with weight loss (amphetamines).  ¨ Treat pain (opiates).  · Infections.  · Muscle diseases that are passed down from parent to child (inherited).  · High fever.  · Heatstroke.  · Not having enough fluids in your body (dehydration).  · Seizures.  · Surgery.  What increases the risk?  This  condition is more likely to develop in people who:  · Have a family history of muscle disease.  · Take part in extreme sports, such as running in marathons.  · Have diabetes.  · Are older.  · Abuse drugs or alcohol.  What are the signs or symptoms?  Symptoms of this condition vary. Some people have very few symptoms, and other people have many symptoms. The most common symptoms include:  · Muscle pain and swelling.  · Weak muscles.  · Dark urine.  · Feeling weak and tired.  Other symptoms include:  · Nausea and vomiting.  · Fever.  · Pain in the abdomen.  · Pain in the joints.  Symptoms of complications from this condition include:  · Heart rhythm that is not normal (arrhythmia).  · Seizures.  · Not urinating enough because of kidney failure.  · Very low blood pressure (shock). Signs of shock include dizziness, blurry vision, and clammy skin.  · Bleeding that is hard to stop or control.  How is this diagnosed?  This condition is diagnosed based on your medical history, your symptoms, and a physical exam. Tests may also be done, including:  · Blood tests.  · Urine tests to check for myoglobin.  You may also have other tests to check for causes of muscle damage and to check for complications.  How is this treated?  Treatment for this condition helps to:  · Make sure you have enough fluids in your body.  · Lower the acid levels in your blood to reverse acidosis.  · Protect your kidneys.  Treatment may include:  · Fluids and medicines given through an IV tube that is inserted into one of your veins.  · Medicines to lower acidosis or to bring back the balance of the minerals in your body.  · Hemodialysis. This treatment uses an artificial kidney machine to filter your blood while you recover. You may have this if other treatments are not helping.  Follow these instructions at home:  · Take over-the-counter and prescription medicines only as told by your health care provider.  · Rest at home until your health care  provider says that you can return to your normal activities.  · Drink enough fluid to keep your urine clear or pale yellow.  · Do not do activities that take a lot of effort (are strenuous). Ask your health care provider what level of exercise is safe for you.  · Do not abuse drugs or alcohol. If you are having problems with drug or alcohol use, ask your health care provider for help.  · Keep all follow-up visits as told by your health care provider. This is important.  Contact a health care provider if:  · You start having symptoms of this condition after treatment.  Get help right away if:  · You have a seizure.  · You bleed easily or cannot control bleeding.  · You cannot urinate.  · You have chest pain.  · You have trouble breathing.  This information is not intended to replace advice given to you by your health care provider. Make sure you discuss any questions you have with your health care provider.  Document Released: 11/30/2005 Document Revised: 09/29/2017 Document Reviewed: 09/29/2017  [employer] [address] Interactive Patient Education © 2017 [employer]        Hyponatremia  Introduction  Hyponatremia is when the amount of salt (sodium) in your blood is too low. When salt levels are low, your cells absorb extra water and they swell. The swelling happens throughout the body, but it mostly affects the brain.  Follow these instructions at home:  · Take medicines only as told by your doctor. Many medicines can make this condition worse. Talk with your doctor about any medicines that you are currently taking.  · Carefully follow a recommended diet as told by your doctor.  · Carefully follow instructions from your doctor about fluid restrictions.  · Keep all follow-up visits as told by your doctor. This is important.  · Do not drink alcohol.  Contact a doctor if:  · You feel sicker to your stomach (nauseous).  · You feel more confused.  · You feel more tired (fatigued).  · Your headache gets worse.  · You feel  weaker.  · Your symptoms go away and then they come back.  · You have trouble following the diet instructions.  Get help right away if:  · You start to twitch and shake (have a seizure).  · You pass out (faint).  · You keep having watery poop (diarrhea).  · You keep throwing up (vomiting).  This information is not intended to replace advice given to you by your health care provider. Make sure you discuss any questions you have with your health care provider.  Document Released: 08/29/2012 Document Revised: 05/25/2017 Document Reviewed: 12/14/2015  © 2017 [employer]      Alcohol Use Disorder  Alcohol use disorder is when your drinking disrupts your daily life. When you have this condition, you drink too much alcohol and you cannot control your drinking.  Alcohol use disorder can cause serious problems with your physical health. It can affect your brain, heart, liver, pancreas, immune system, stomach, and intestines. Alcohol use disorder can increase your risk for certain cancers and cause problems with your mental health, such as depression, anxiety, psychosis, delirium, and dementia. People with this disorder risk hurting themselves and others.  What are the causes?  This condition is caused by drinking too much alcohol over time. It is not caused by drinking too much alcohol only one or two times. Some people with this condition drink alcohol to cope with or escape from negative life events. Others drink to relieve pain or symptoms of mental illness.  What increases the risk?  You are more likely to develop this condition if:  · You have a family history of alcohol use disorder.  · Your culture encourages drinking to the point of intoxication, or makes alcohol easy to get.  · You had a mood or conduct disorder in childhood.  · You have been a victim of abuse.  · You are an adolescent and:  ¨ You have poor grades or difficulties in school.  ¨ Your caregivers do not talk to you about saying no to alcohol, or supervise  your activities.  ¨ You are impulsive or you have trouble with self-control.  What are the signs or symptoms?  Symptoms of this condition include:  · Drinking more than you want to.  · Drinking for longer than you want to.  · Trying several times to drink less or to control your drinking.  · Spending a lot of time getting alcohol, drinking, or recovering from drinking.  · Craving alcohol.  · Having problems at work, at school, or at home due to drinking.  · Having problems in relationships due to drinking.  · Drinking when it is dangerous to drink, such as before driving a car.  · Continuing to drink even though you know you might have a physical or mental problem related to drinking.  · Needing more and more alcohol to get the same effect you want from the alcohol (building up tolerance).  · Having symptoms of withdrawal when you stop drinking. Symptoms of withdrawal include:  ¨ Fatigue.  ¨ Nightmares.  ¨ Trouble sleeping.  ¨ Depression.  ¨ Anxiety.  ¨ Fever.  ¨ Seizures.  ¨ Severe confusion.  ¨ Feeling or seeing things that are not there (hallucinations).  ¨ Tremors.  ¨ Rapid heart rate.  ¨ Rapid breathing.  ¨ High blood pressure.  · Drinking to avoid symptoms of withdrawal.  How is this diagnosed?  This condition is diagnosed with an assessment. Your health care provider may start the assessment by asking three or four questions about your drinking.  Your health care provider may perform a physical exam or do lab tests to see if you have physical problems resulting from alcohol use. She or he may refer you to a mental health professional for evaluation.  How is this treated?  Some people with alcohol use disorder are able to reduce their alcohol use to low-risk levels. Others need to completely quit drinking alcohol. When necessary, mental health professionals with specialized training in substance use treatment can help. Your health care provider can help you decide how severe your alcohol use disorder is and  what type of treatment you need. The following forms of treatment are available:  · Detoxification. Detoxification involves quitting drinking and using prescription medicines within the first week to help lessen withdrawal symptoms. This treatment is important for people who have had withdrawal symptoms before and for heavy drinkers who are likely to have withdrawal symptoms. Alcohol withdrawal can be dangerous, and in severe cases, it can cause death. Detoxification may be provided in a home, community, or primary care setting, or in a hospital or substance use treatment facility.  · Counseling. This treatment is also called talk therapy. It is provided by substance use treatment counselors. A counselor can address the reasons you use alcohol and suggest ways to keep you from drinking again or to prevent problem drinking. The goals of talk therapy are to:  ¨ Find healthy activities and ways for you to cope with stress.  ¨ Identify and avoid the things that trigger your alcohol use.  ¨ Help you learn how to handle cravings.  · Medicines. Medicines can help treat alcohol use disorder by:  ¨ Decreasing alcohol cravings.  ¨ Decreasing the positive feeling you have when you drink alcohol.  ¨ Causing an uncomfortable physical reaction when you drink alcohol (aversion therapy).  · Support groups. Support groups are led by people who have quit drinking. They provide emotional support, advice, and guidance.  These forms of treatment are often combined. Some people with this condition benefit from a combination of treatments provided by specialized substance use treatment centers.  Follow these instructions at home:  · Take over-the-counter and prescription medicines only as told by your health care provider.  · Check with your health care provider before starting any new medicines.  · Ask friends and family members not to offer you alcohol.  · Avoid situations where alcohol is served, including gatherings where others are  drinking alcohol.  · Create a plan for what to do when you are tempted to use alcohol.  · Find hobbies or activities that you enjoy that do not include alcohol.  · Keep all follow-up visits as told by your health care provider. This is important.  How is this prevented?  · If you drink, limit alcohol intake to no more than 1 drink a day for nonpregnant women and 2 drinks a day for men. One drink equals 12 oz of beer, 5 oz of wine, or 1½ oz of hard liquor.  · If you have a mental health condition, get treatment and support.  · Do not give alcohol to adolescents.  · If you are an adolescent:  ¨ Do not drink alcohol.  ¨ Do not be afraid to say no if someone offers you alcohol. Speak up about why you do not want to drink. You can be a positive role model for your friends and set a good example for those around you by not drinking alcohol.  ¨ If your friends drink, spend time with others who do not drink alcohol. Make new friends who do not use alcohol.  ¨ Find healthy ways to manage stress and emotions, such as meditation or deep breathing, exercise, spending time in nature, listening to music, or talking with a trusted friend or family member.  Contact a health care provider if:  · You are not able to take your medicines as told.  · Your symptoms get worse.  · You return to drinking alcohol (relapse) and your symptoms get worse.  Get help right away if:  · You have thoughts about hurting yourself or others.  If you ever feel like you may hurt yourself or others, or have thoughts about taking your own life, get help right away. You can go to your nearest emergency department or call:  · Your local emergency services (911 in the [place]  · A suicide crisis helpline, such as the National Suicide Prevention Lifeline at 1-196.461.1379. This is open 24 hours a day.  Summary  · Alcohol use disorder is when your drinking disrupts your daily life. When you have this condition, you drink too much alcohol and you cannot control  your drinking.  · Treatment may include detoxification, counseling, medicine, and support groups.  · Ask friends and family members not to offer you alcohol. Avoid situations where alcohol is served.  · Get help right away if you have thoughts about hurting yourself or others.  This information is not intended to replace advice given to you by your health care provider. Make sure you discuss any questions you have with your health care provider.  Document Released: 01/25/2006 Document Revised: 09/14/2017 Document Reviewed: 09/14/2017  [employer] Interactive Patient Education © 2017 [employer]

## 2019-06-27 NOTE — PSYCHIATRY
PSYCHIATRIC FOLLOW UP:    Reason for admission:    BIB EMS L2K for odd behavior and walking barefoot many miles  Reason for consult:psychosis and altered mental status   Requesting Physician: Dr. Villela  Supervising Physician:Dr. Talley       Subjective:  48-year-old white male with history of TBI, hemorrhagic stroke, and frontal encephalomalacia, with history of seizure disorder, presented for psychiatric follow up. The patient denies noticeable side effect from the Abilify and namenda. He is amenable to continuing these medications and working with his outpatient providers in [place]. He states that his father will pick him up tonight. He is attempting to compile a list of his belongings that were in his truck and plans to file the police report and insurance report today or tomorrow. He states that he is sleeping and eating well. He denies SI/HI.        Psychiatric Review of Systems:current symptoms as reported by pt.  Pt denies depression, anxiety, SI/HI, dominique, psychosis, a/v hallucinations at this time  Pt states they are sleeping, eating, and defecating/urinating appropriately    Medical Review of Systems: as reported by pt. All systems reviewed. Only those found to be + are noted below. All others are negative.     Denies headaches, vision changes, difficulty swallowing, chest pain, shortness of breath, nausea/vomiting, diarrhea/constipation, paresthesias/neuropathy, stiff/aching muscles, dizziness, difficulty walking (endorses sore feet/calves)      Medications Administered in Last 48 Hours from 06/25/2019 1502 to 06/27/2019 1502     Date/Time Order Dose Route Action Comments    06/27/2019 0521 senna-docusate (PERICOLACE or SENOKOT S) 8.6-50 MG per tablet 2 Tab 2 Tab Oral Given     06/26/2019 1800 senna-docusate (PERICOLACE or SENOKOT S) 8.6-50 MG per tablet 2 Tab 0 Tab Oral Held multiple loose bowel movements from lactulose.    06/26/2019 0600 senna-docusate (PERICOLACE or SENOKOT S) 8.6-50 MG per tablet 2  Tab 0 Tab Oral Held pt already w/ looser stools and receiving lactulose    06/25/2019 1800 senna-docusate (PERICOLACE or SENOKOT S) 8.6-50 MG per tablet 2 Tab 0 Tab Oral Held loose stool    06/27/2019 1400 heparin injection 5,000 Units 5,000 Units Subcutaneous Refused     06/27/2019 0522 heparin injection 5,000 Units 5,000 Units Subcutaneous Given     06/26/2019 2025 heparin injection 5,000 Units 5,000 Units Subcutaneous Given     06/26/2019 1312 heparin injection 5,000 Units 5,000 Units Subcutaneous Given     06/26/2019 0546 heparin injection 5,000 Units 5,000 Units Subcutaneous Given     06/25/2019 2017 heparin injection 5,000 Units 5,000 Units Subcutaneous Given     06/27/2019 0522 thiamine tablet 100 mg 100 mg Oral Given     06/26/2019 0546 thiamine tablet 100 mg 100 mg Oral Given     06/27/2019 0521 multivitamin (THERAGRAN) tablet 1 Tab 1 Tab Oral Given     06/26/2019 0546 multivitamin (THERAGRAN) tablet 1 Tab 1 Tab Oral Given     06/27/2019 0522 folic acid (FOLVITE) tablet 1 mg 1 mg Oral Given     06/26/2019 0546 folic acid (FOLVITE) tablet 1 mg 1 mg Oral Given     06/27/2019 0000 NS infusion 0  Intravenous Stopped Per order to stop after NS bag is finished    06/26/2019 0812 NS infusion   Intravenous New Bag     06/26/2019 0708 NS infusion   Intravenous Rate Change     06/26/2019 0127 NS infusion   Intravenous New Bag     06/25/2019 1839 NS infusion   Intravenous New Bag     06/26/2019 1235 lactulose 20 GM/30ML solution 30 mL 30 mL Oral Given     06/26/2019 0544 lactulose 20 GM/30ML solution 30 mL 30 mL Oral Given     06/25/2019 1702 lactulose 20 GM/30ML solution 30 mL 30 mL Oral Given     06/27/2019 0522 levETIRAcetam (KEPPRA) tablet 500 mg 500 mg Oral Given     06/26/2019 1717 levETIRAcetam (KEPPRA) tablet 500 mg 500 mg Oral Given     06/26/2019 0544 levETIRAcetam (KEPPRA) tablet 500 mg 500 mg Oral Given     06/25/2019 2017 levETIRAcetam (KEPPRA) tablet 500 mg 500 mg Oral Given     06/25/2019 7310  acetaminophen (TYLENOL) tablet 650 mg 650 mg Oral Given     06/27/2019 0525 ARIPiprazole (ABILIFY) tablet 2 mg 2 mg Oral Given     06/26/2019 1234 ARIPiprazole (ABILIFY) tablet 2 mg 2 mg Oral Given     06/27/2019 0522 memantine (NAMENDA) tablet 5 mg 5 mg Oral Given     06/26/2019 1233 memantine (NAMENDA) tablet 5 mg 5 mg Oral Given     06/27/2019 0521 nicotine (NICODERM) 21 MG/24HR 21 mg 21 mg Transdermal Patch Applied     06/27/2019 0435 nicotine (NICODERM) 21 MG/24HR 21 mg 21 mg Transdermal Patch Removed left deltoid patch removed    06/26/2019 1235 nicotine (NICODERM) 21 MG/24HR 21 mg 21 mg Transdermal Patch Applied           Psychiatric Examination (MSE) :    Vitals: /93   Pulse 79   Temp 36.6 °C (97.8 °F) (Temporal)   Resp 17   Ht 1.829 m (6')   Wt 86.5 kg (190 lb 11.2 oz)   SpO2 98%   BMI 25.86 kg/m²   Appearance: Average weight white male who appears clean and hygienic in hospital attire, appears stated age,  Motor: Patient has abnormal blink rate, no tics/tremors/stereotyped behavior, patient walks with antalgic gait but is able to transfer from bed and walk without assistance  Speech: Clear, fluent English without slurring, mumbling, or speech impediment; normal rate, prosody, tone, volume, and inflection; appropriate content  Thought Process: Disorganized to recent timeline, appropriate responses to direct question,  goal oriented today  No loosening of association  Thought Content: Denies auditory visual hallucinations; not seem to respond to internal stimuli; no paranoia or delusions noted  Insight/Judgement: Fair/fair  Orientation: Patient is alert and oriented to person, place, time, situation  Memory: Patient appears to have impairment in executive functioning and memory  Behavior: pt is intrusive and has to be redirected away from nursing station three times as he desires to repeat his list multiple times after completion of interview; pt appears to have limited recognition of social  "cues and requires direct statements   Attention/Concentration: Intact to conversation  Fund of Knowledge: Below average based on syntax  Mood: \"All right\"  Affect:          Congruent, euthymic, non-tearful, non-labile  SI/HI:   Denies suicidal and homicidal ideation  Neurological Testing: (MOCA, MMSE, clock) deferred    Recent Results (from the past 72 hour(s))   CBC WITH DIFFERENTIAL    Collection Time: 06/24/19  8:18 PM   Result Value Ref Range    WBC 12.3 (H) 4.8 - 10.8 K/uL    RBC 4.12 (L) 4.70 - 6.10 M/uL    Hemoglobin 14.5 14.0 - 18.0 g/dL    Hematocrit 42.7 42.0 - 52.0 %    .6 (H) 81.4 - 97.8 fL    MCH 35.2 (H) 27.0 - 33.0 pg    MCHC 34.0 33.7 - 35.3 g/dL    RDW 46.3 35.9 - 50.0 fL    Platelet Count 220 164 - 446 K/uL    MPV 9.8 9.0 - 12.9 fL    Neutrophils-Polys 82.10 (H) 44.00 - 72.00 %    Lymphocytes 4.60 (L) 22.00 - 41.00 %    Monocytes 11.50 0.00 - 13.40 %    Eosinophils 0.50 0.00 - 6.90 %    Basophils 0.20 0.00 - 1.80 %    Immature Granulocytes 1.10 (H) 0.00 - 0.90 %    Nucleated RBC 0.00 /100 WBC    Neutrophils (Absolute) 10.11 (H) 1.82 - 7.42 K/uL    Lymphs (Absolute) 0.57 (L) 1.00 - 4.80 K/uL    Monos (Absolute) 1.41 (H) 0.00 - 0.85 K/uL    Eos (Absolute) 0.06 0.00 - 0.51 K/uL    Baso (Absolute) 0.03 0.00 - 0.12 K/uL    Immature Granulocytes (abs) 0.13 (H) 0.00 - 0.11 K/uL    NRBC (Absolute) 0.00 K/uL   Comp Metabolic Panel    Collection Time: 06/24/19  8:18 PM   Result Value Ref Range    Sodium 130 (L) 135 - 145 mmol/L    Potassium 4.6 3.6 - 5.5 mmol/L    Chloride 95 (L) 96 - 112 mmol/L    Co2 21 20 - 33 mmol/L    Anion Gap 14.0 (H) 0.0 - 11.9    Glucose 109 (H) 65 - 99 mg/dL    Bun 42 (H) 8 - 22 mg/dL    Creatinine 1.82 (H) 0.50 - 1.40 mg/dL    Calcium 9.5 8.5 - 10.5 mg/dL    AST(SGOT) 95 (H) 12 - 45 U/L    ALT(SGPT) 89 (H) 2 - 50 U/L    Alkaline Phosphatase 79 30 - 99 U/L    Total Bilirubin 1.7 (H) 0.1 - 1.5 mg/dL    Albumin 4.9 3.2 - 4.9 g/dL    Total Protein 7.7 6.0 - 8.2 g/dL    Globulin " 2.8 1.9 - 3.5 g/dL    A-G Ratio 1.8 g/dL   DIAGNOSTIC ALCOHOL    Collection Time: 06/24/19  8:18 PM   Result Value Ref Range    Diagnostic Alcohol 0.01 (H) 0.00 g/dL   CREATINE KINASE    Collection Time: 06/24/19  8:18 PM   Result Value Ref Range    CPK Total 1334 (H) 0 - 154 U/L   PT/INR    Collection Time: 06/24/19  8:18 PM   Result Value Ref Range    PT 13.1 12.0 - 14.6 sec    INR 0.97 0.87 - 1.13   ESTIMATED GFR    Collection Time: 06/24/19  8:18 PM   Result Value Ref Range    GFR If  48 (A) >60 mL/min/1.73 m 2    GFR If Non African American 40 (A) >60 mL/min/1.73 m 2   MAGNESIUM    Collection Time: 06/24/19  8:18 PM   Result Value Ref Range    Magnesium 2.5 1.5 - 2.5 mg/dL   URINE DRUG SCREEN    Collection Time: 06/24/19 11:53 PM   Result Value Ref Range    Amphetamines Urine Negative Negative    Barbiturates Negative Negative    Benzodiazepines Negative Negative    Cocaine Metabolite Negative Negative    Methadone Negative Negative    Opiates Negative Negative    Oxycodone Negative Negative    Phencyclidine -Pcp Negative Negative    Propoxyphene Negative Negative    Cannabinoid Metab Negative Negative   URINALYSIS    Collection Time: 06/24/19 11:53 PM   Result Value Ref Range    Color Yellow     Character Clear     Specific Gravity 1.024 <1.035    Ph 5.5 5.0 - 8.0    Glucose Negative Negative mg/dL    Ketones 15 (A) Negative mg/dL    Protein Negative Negative mg/dL    Bilirubin Negative Negative    Urobilinogen, Urine 0.2 Negative    Nitrite Negative Negative    Leukocyte Esterase Negative Negative    Occult Blood Trace (A) Negative    Micro Urine Req Microscopic    URINE MICROSCOPIC (W/UA)    Collection Time: 06/24/19 11:53 PM   Result Value Ref Range    WBC 0-2 (A) /hpf    RBC 0-2 (A) /hpf    Bacteria Negative None /hpf    Epithelial Cells Negative /hpf    Hyaline Cast 3-5 (A) /lpf   OSMOLALITY URINE    Collection Time: 06/24/19 11:53 PM   Result Value Ref Range    Osmolality Urine 726 300 -  900 mOsm/kg H2O   URINE PROTEIN RANDOM    Collection Time: 06/24/19 11:53 PM   Result Value Ref Range    Total Protein, Urine 29.1 (H) 0.0 - 15.0 mg/dL   URINE CREATININE RANDOM    Collection Time: 06/24/19 11:53 PM   Result Value Ref Range    Creatinine, Random Urine 185.20 mg/dL   URINE CHLORIDE RANDOM    Collection Time: 06/24/19 11:53 PM   Result Value Ref Range    Chloride, Urine-per volume <15 mmol/L   URINE POTASSIUM RANDOM    Collection Time: 06/24/19 11:53 PM   Result Value Ref Range    Potassium 60.9 mmol/L   URINE SODIUM RANDOM    Collection Time: 06/24/19 11:53 PM   Result Value Ref Range    Sodium, Urine -per volume 21 mmol/L   AMMONIA    Collection Time: 06/25/19  3:45 AM   Result Value Ref Range    Ammonia 58 (H) 11 - 45 umol/L   TROPONIN    Collection Time: 06/25/19  3:45 AM   Result Value Ref Range    Troponin I 0.02 0.00 - 0.04 ng/mL   OSMOLALITY SERUM    Collection Time: 06/25/19  3:45 AM   Result Value Ref Range    Osmolality Serum 278 278 - 298 mOsm/kg H2O   HEPATITIS PANEL ACUTE(4 COMPONENTS)    Collection Time: 06/25/19  3:45 AM   Result Value Ref Range    Hepatitis B Surface Antigen Negative Negative    Hepatitis B Cors Ab,IgM Negative Negative    Hepatitis A Virus Ab, IgM Negative Negative    Hepatitis C Antibody Negative Negative   BLOOD CULTURE    Collection Time: 06/25/19 11:33 AM   Result Value Ref Range    Significant Indicator NEG     Source BLD     Site PERIPHERAL     Culture Result       No Growth  Note: Blood cultures are incubated for 5 days and  are monitored continuously.Positive blood cultures  are called to the RN and reported as soon as  they are identified.     BLOOD CULTURE    Collection Time: 06/25/19 11:33 AM   Result Value Ref Range    Significant Indicator NEG     Source BLD     Site PERIPHERAL     Culture Result       No Growth  Note: Blood cultures are incubated for 5 days and  are monitored continuously.Positive blood cultures  are called to the RN and reported as  soon as  they are identified.     CBC without Differential    Collection Time: 06/26/19  3:45 AM   Result Value Ref Range    WBC 6.1 4.8 - 10.8 K/uL    RBC 3.17 (L) 4.70 - 6.10 M/uL    Hemoglobin 11.4 (L) 14.0 - 18.0 g/dL    Hematocrit 32.9 (L) 42.0 - 52.0 %    .2 (H) 81.4 - 97.8 fL    MCH 36.0 (H) 27.0 - 33.0 pg    MCHC 34.9 33.7 - 35.3 g/dL    RDW 44.8 35.9 - 50.0 fL    Platelet Count 187 164 - 446 K/uL    MPV 9.5 9.0 - 12.9 fL   Comp Metabolic Panel (CMP)    Collection Time: 06/26/19  3:45 AM   Result Value Ref Range    Sodium 131 (L) 135 - 145 mmol/L    Potassium 3.6 3.6 - 5.5 mmol/L    Chloride 101 96 - 112 mmol/L    Co2 22 20 - 33 mmol/L    Anion Gap 8.0 0.0 - 11.9    Glucose 103 (H) 65 - 99 mg/dL    Bun 9 8 - 22 mg/dL    Creatinine 0.71 0.50 - 1.40 mg/dL    Calcium 8.3 (L) 8.5 - 10.5 mg/dL    AST(SGOT) 66 (H) 12 - 45 U/L    ALT(SGPT) 69 (H) 2 - 50 U/L    Alkaline Phosphatase 67 30 - 99 U/L    Total Bilirubin 0.7 0.1 - 1.5 mg/dL    Albumin 3.2 3.2 - 4.9 g/dL    Total Protein 5.3 (L) 6.0 - 8.2 g/dL    Globulin 2.1 1.9 - 3.5 g/dL    A-G Ratio 1.5 g/dL   CREATINE KINASE    Collection Time: 06/26/19  3:45 AM   Result Value Ref Range    CPK Total 638 (H) 0 - 154 U/L   Magnesium    Collection Time: 06/26/19  3:45 AM   Result Value Ref Range    Magnesium 1.7 1.5 - 2.5 mg/dL   Phosphorus    Collection Time: 06/26/19  3:45 AM   Result Value Ref Range    Phosphorus 2.9 2.5 - 4.5 mg/dL   VITAMIN B12    Collection Time: 06/26/19  3:45 AM   Result Value Ref Range    Vitamin B12 -True Cobalamin 504 211 - 911 pg/mL   ESTIMATED GFR    Collection Time: 06/26/19  3:45 AM   Result Value Ref Range    GFR If African American >60 >60 mL/min/1.73 m 2    GFR If Non African American >60 >60 mL/min/1.73 m 2   Comp Metabolic Panel    Collection Time: 06/27/19  3:54 AM   Result Value Ref Range    Sodium 131 (L) 135 - 145 mmol/L    Potassium 3.8 3.6 - 5.5 mmol/L    Chloride 101 96 - 112 mmol/L    Co2 25 20 - 33 mmol/L    Anion Gap  5.0 0.0 - 11.9    Glucose 167 (H) 65 - 99 mg/dL    Bun 9 8 - 22 mg/dL    Creatinine 0.81 0.50 - 1.40 mg/dL    Calcium 8.9 8.5 - 10.5 mg/dL    AST(SGOT) 79 (H) 12 - 45 U/L    ALT(SGPT) 115 (H) 2 - 50 U/L    Alkaline Phosphatase 80 30 - 99 U/L    Total Bilirubin 0.4 0.1 - 1.5 mg/dL    Albumin 3.5 3.2 - 4.9 g/dL    Total Protein 5.8 (L) 6.0 - 8.2 g/dL    Globulin 2.3 1.9 - 3.5 g/dL    A-G Ratio 1.5 g/dL   CBC WITHOUT DIFFERENTIAL    Collection Time: 06/27/19  3:54 AM   Result Value Ref Range    WBC 7.5 4.8 - 10.8 K/uL    RBC 3.43 (L) 4.70 - 6.10 M/uL    Hemoglobin 12.1 (L) 14.0 - 18.0 g/dL    Hematocrit 35.3 (L) 42.0 - 52.0 %    .9 (H) 81.4 - 97.8 fL    MCH 35.3 (H) 27.0 - 33.0 pg    MCHC 34.3 33.7 - 35.3 g/dL    RDW 43.3 35.9 - 50.0 fL    Platelet Count 233 164 - 446 K/uL    MPV 9.5 9.0 - 12.9 fL   CREATINE KINASE    Collection Time: 06/27/19  3:54 AM   Result Value Ref Range    CPK Total 626 (H) 0 - 154 U/L   ESTIMATED GFR    Collection Time: 06/27/19  3:54 AM   Result Value Ref Range    GFR If African American >60 >60 mL/min/1.73 m 2    GFR If Non African American >60 >60 mL/min/1.73 m 2       Assessment:  Neurocognitive disorder multiple etiology (TBI, CVA, vascular dementia)  TBI sequela   Hx of hemorraghic stroke  Frontal encephalomalacia  Delirium, improving  Seizure disorder  Alcohol Use Disorder, unspecified severity  Nicotine Use Disorder (1ppd)  Plan:   Patient and medical doctor report that he is discharging this afternoon to care of his father with plans to return to his outpatient providers in [place].  Recommend continuing to take Abilify and Namenda with transition to outpatient care   Legal status: voluntary  Anticipate F/U: NO   Labs/tests ordered: NO   Records reviewed: YES    Discussed patient with other provider: Dr. Talley    Signing off  Thank you for the consult.

## 2019-06-28 NOTE — PROGRESS NOTES
Patient discharged home. Patient AOX 4.  IV and tele box removed, discharge instructions provided to patient and reviewed with them. All questions answered, patient provided copy of discharge instructions and instructed on when to F/U with MD. Patient walked off unit. Patient discharged into the care of father.

## 2019-06-28 NOTE — ASSESSMENT & PLAN NOTE
I spent 4 minutes on Tobacco cessation education and counseling.   I Discussed the benefits of quitting smoking and risks of continued smoking including cardiovascular disease, cancer and COPD.   Discussed options of nicotine patch, medical treatment with Chantix [Varenicline].

## 2019-06-29 LAB — VIT B1 BLD-MCNC: 144 NMOL/L (ref 70–180)

## 2019-06-30 LAB
BACTERIA BLD CULT: NORMAL
BACTERIA BLD CULT: NORMAL
SIGNIFICANT IND 70042: NORMAL
SIGNIFICANT IND 70042: NORMAL
SITE SITE: NORMAL
SITE SITE: NORMAL
SOURCE SOURCE: NORMAL
SOURCE SOURCE: NORMAL